# Patient Record
Sex: FEMALE | Race: WHITE | NOT HISPANIC OR LATINO | Employment: OTHER | URBAN - METROPOLITAN AREA
[De-identification: names, ages, dates, MRNs, and addresses within clinical notes are randomized per-mention and may not be internally consistent; named-entity substitution may affect disease eponyms.]

---

## 2019-01-18 ENCOUNTER — OFFICE VISIT CONVERTED (OUTPATIENT)
Dept: PULMONOLOGY | Facility: CLINIC | Age: 59
End: 2019-01-18
Attending: INTERNAL MEDICINE

## 2019-03-06 ENCOUNTER — HOSPITAL ENCOUNTER (OUTPATIENT)
Dept: GENERAL RADIOLOGY | Facility: HOSPITAL | Age: 59
Discharge: HOME | End: 2019-03-06
Attending: INTERNAL MEDICINE

## 2019-04-16 ENCOUNTER — HOSPITAL ENCOUNTER (OUTPATIENT)
Dept: GENERAL RADIOLOGY | Facility: HOSPITAL | Age: 59
Discharge: HOME | End: 2019-04-16
Attending: NURSE PRACTITIONER

## 2019-04-17 ENCOUNTER — OFFICE VISIT CONVERTED (OUTPATIENT)
Dept: PULMONOLOGY | Facility: CLINIC | Age: 59
End: 2019-04-17
Attending: INTERNAL MEDICINE

## 2019-05-02 ENCOUNTER — HOSPITAL ENCOUNTER (OUTPATIENT)
Dept: GENERAL RADIOLOGY | Facility: HOSPITAL | Age: 59
Discharge: HOME | End: 2019-05-02
Attending: NURSE PRACTITIONER

## 2019-05-17 ENCOUNTER — HOSPITAL ENCOUNTER (OUTPATIENT)
Dept: ULTRASOUND IMAGING | Facility: HOSPITAL | Age: 59
Discharge: HOME | End: 2019-05-17
Attending: NURSE PRACTITIONER

## 2019-06-05 ENCOUNTER — HOSPITAL ENCOUNTER (OUTPATIENT)
Dept: SLEEP MEDICINE | Facility: HOSPITAL | Age: 59
Discharge: HOME | End: 2019-06-05
Attending: INTERNAL MEDICINE

## 2019-06-10 ENCOUNTER — OFFICE VISIT CONVERTED (OUTPATIENT)
Dept: SURGERY | Facility: CLINIC | Age: 59
End: 2019-06-10
Attending: SURGERY

## 2019-07-09 ENCOUNTER — HOSPITAL ENCOUNTER (OUTPATIENT)
Dept: PERIOP | Facility: HOSPITAL | Age: 59
Setting detail: HOSPITAL OUTPATIENT SURGERY
Discharge: HOME | End: 2019-07-09
Attending: SURGERY

## 2019-07-09 LAB
ANION GAP SERPL CALC-SCNC: 12 MMOL/L (ref 8–19)
BASOPHILS # BLD AUTO: 0.04 10*3/UL (ref 0–0.2)
BASOPHILS NFR BLD AUTO: 0.4 % (ref 0–3)
BUN SERPL-MCNC: 15 MG/DL (ref 5–25)
BUN/CREAT SERPL: 17 {RATIO} (ref 6–20)
CALCIUM SERPL-MCNC: 8.9 MG/DL (ref 8.7–10.4)
CHLORIDE SERPL-SCNC: 104 MMOL/L (ref 99–111)
CONV ABS IMM GRAN: 0.02 10*3/UL (ref 0–0.2)
CONV CO2: 31 MMOL/L (ref 22–32)
CONV IMMATURE GRAN: 0.2 % (ref 0–1.8)
CREAT UR-MCNC: 0.87 MG/DL (ref 0.5–0.9)
DEPRECATED RDW RBC AUTO: 47 FL (ref 36.4–46.3)
EOSINOPHIL # BLD AUTO: 0.16 10*3/UL (ref 0–0.7)
EOSINOPHIL # BLD AUTO: 1.7 % (ref 0–7)
ERYTHROCYTE [DISTWIDTH] IN BLOOD BY AUTOMATED COUNT: 12.8 % (ref 11.7–14.4)
GFR SERPLBLD BASED ON 1.73 SQ M-ARVRAT: >60 ML/MIN/{1.73_M2}
GLUCOSE SERPL-MCNC: 106 MG/DL (ref 65–99)
HBA1C MFR BLD: 11.6 G/DL (ref 12–16)
HCT VFR BLD AUTO: 36.8 % (ref 37–47)
LYMPHOCYTES # BLD AUTO: 4.05 10*3/UL (ref 1–5)
MCH RBC QN AUTO: 31.5 PG (ref 27–31)
MCHC RBC AUTO-ENTMCNC: 31.5 G/DL (ref 33–37)
MCV RBC AUTO: 100 FL (ref 81–99)
MONOCYTES # BLD AUTO: 0.62 10*3/UL (ref 0.2–1.2)
MONOCYTES NFR BLD AUTO: 6.5 % (ref 3–10)
NEUTROPHILS # BLD AUTO: 4.6 10*3/UL (ref 2–8)
NEUTROPHILS NFR BLD AUTO: 48.5 % (ref 30–85)
NRBC CBCN: 0 % (ref 0–0.7)
OSMOLALITY SERPL CALC.SUM OF ELEC: 297 MOSM/KG (ref 273–304)
PLATELET # BLD AUTO: 243 10*3/UL (ref 130–400)
PMV BLD AUTO: 8.8 FL (ref 9.4–12.3)
POTASSIUM SERPL-SCNC: 3.8 MMOL/L (ref 3.5–5.3)
RBC # BLD AUTO: 3.68 10*6/UL (ref 4.2–5.4)
SODIUM SERPL-SCNC: 143 MMOL/L (ref 135–147)
VARIANT LYMPHS NFR BLD MANUAL: 42.7 % (ref 20–45)
WBC # BLD AUTO: 9.49 10*3/UL (ref 4.8–10.8)

## 2019-07-17 ENCOUNTER — OFFICE VISIT CONVERTED (OUTPATIENT)
Dept: SURGERY | Facility: CLINIC | Age: 59
End: 2019-07-17
Attending: SURGERY

## 2019-08-02 ENCOUNTER — OFFICE VISIT CONVERTED (OUTPATIENT)
Dept: PULMONOLOGY | Facility: CLINIC | Age: 59
End: 2019-08-02
Attending: INTERNAL MEDICINE

## 2019-09-20 ENCOUNTER — OFFICE VISIT CONVERTED (OUTPATIENT)
Dept: SURGERY | Facility: CLINIC | Age: 59
End: 2019-09-20
Attending: SURGERY

## 2019-10-17 ENCOUNTER — HOSPITAL ENCOUNTER (OUTPATIENT)
Dept: PERIOP | Facility: HOSPITAL | Age: 59
Setting detail: HOSPITAL OUTPATIENT SURGERY
Discharge: HOME | End: 2019-10-17
Attending: SURGERY

## 2019-10-22 ENCOUNTER — OFFICE VISIT CONVERTED (OUTPATIENT)
Dept: SURGERY | Facility: CLINIC | Age: 59
End: 2019-10-22
Attending: SURGERY

## 2019-10-22 ENCOUNTER — CONVERSION ENCOUNTER (OUTPATIENT)
Dept: SURGERY | Facility: CLINIC | Age: 59
End: 2019-10-22

## 2019-11-04 ENCOUNTER — OFFICE VISIT CONVERTED (OUTPATIENT)
Dept: SURGERY | Facility: CLINIC | Age: 59
End: 2019-11-04
Attending: SURGERY

## 2019-11-18 ENCOUNTER — OFFICE VISIT CONVERTED (OUTPATIENT)
Dept: SURGERY | Facility: CLINIC | Age: 59
End: 2019-11-18
Attending: SURGERY

## 2019-11-18 ENCOUNTER — CONVERSION ENCOUNTER (OUTPATIENT)
Dept: SURGERY | Facility: CLINIC | Age: 59
End: 2019-11-18

## 2020-05-05 ENCOUNTER — OFFICE VISIT CONVERTED (OUTPATIENT)
Dept: PULMONOLOGY | Facility: CLINIC | Age: 60
End: 2020-05-05
Attending: INTERNAL MEDICINE

## 2020-08-14 ENCOUNTER — HOSPITAL ENCOUNTER (OUTPATIENT)
Dept: GENERAL RADIOLOGY | Facility: HOSPITAL | Age: 60
Discharge: HOME | End: 2020-08-14
Attending: INTERNAL MEDICINE

## 2021-03-04 ENCOUNTER — HOSPITAL ENCOUNTER (OUTPATIENT)
Dept: GENERAL RADIOLOGY | Facility: HOSPITAL | Age: 61
Discharge: HOME | End: 2021-03-04
Attending: NURSE PRACTITIONER

## 2021-05-15 VITALS — HEIGHT: 65 IN | WEIGHT: 175.5 LBS | RESPIRATION RATE: 14 BRPM | BODY MASS INDEX: 29.24 KG/M2

## 2021-05-15 VITALS — BODY MASS INDEX: 29.32 KG/M2 | RESPIRATION RATE: 16 BRPM | HEIGHT: 65 IN | WEIGHT: 176 LBS

## 2021-05-15 VITALS — WEIGHT: 173.12 LBS | HEIGHT: 66 IN | BODY MASS INDEX: 27.82 KG/M2 | RESPIRATION RATE: 14 BRPM

## 2021-05-15 VITALS — WEIGHT: 169 LBS | BODY MASS INDEX: 27.16 KG/M2 | HEIGHT: 66 IN | RESPIRATION RATE: 16 BRPM

## 2021-05-15 VITALS — WEIGHT: 167 LBS | RESPIRATION RATE: 14 BRPM | HEIGHT: 66 IN | BODY MASS INDEX: 26.84 KG/M2

## 2021-05-15 VITALS — BODY MASS INDEX: 27.97 KG/M2 | WEIGHT: 174 LBS | RESPIRATION RATE: 14 BRPM | HEIGHT: 66 IN

## 2021-05-28 VITALS
BODY MASS INDEX: 28.04 KG/M2 | TEMPERATURE: 98.5 F | SYSTOLIC BLOOD PRESSURE: 111 MMHG | HEART RATE: 83 BPM | OXYGEN SATURATION: 96 % | TEMPERATURE: 97.8 F | HEART RATE: 72 BPM | WEIGHT: 168.56 LBS | DIASTOLIC BLOOD PRESSURE: 60 MMHG | DIASTOLIC BLOOD PRESSURE: 65 MMHG | SYSTOLIC BLOOD PRESSURE: 122 MMHG | RESPIRATION RATE: 14 BRPM | BODY MASS INDEX: 29.3 KG/M2 | SYSTOLIC BLOOD PRESSURE: 121 MMHG | OXYGEN SATURATION: 93 % | RESPIRATION RATE: 14 BRPM | HEIGHT: 65 IN | RESPIRATION RATE: 14 BRPM | BODY MASS INDEX: 28.08 KG/M2 | HEART RATE: 80 BPM | WEIGHT: 182.31 LBS | WEIGHT: 168.31 LBS | TEMPERATURE: 98 F | OXYGEN SATURATION: 95 % | DIASTOLIC BLOOD PRESSURE: 69 MMHG | HEIGHT: 66 IN | HEIGHT: 65 IN

## 2021-05-28 NOTE — PROGRESS NOTES
Patient: MURIEL CASTRO     Acct: [id number]     Report: #[id number]  UNIT #: [id number]     : 1960    Encounter Date:2019  PRIMARY CARE: CATHY WELLINGTON  ***Signed***  --------------------------------------------------------------------------------------------------------------------  Chief Complaint      Encounter Date      2019            Primary Care Provider      RADHA LOPEZ            Referring Provider      RADHA LOPEZ            Patient Complaint      Patient is complaining of      copd            VITALS      Height 5 ft 6 in / 167.64 cm      Weight 182 lbs 5 oz / 82.444819 kg      BSA 1.92 m2      BMI 29.4 kg/m2      Temperature 98.5 F / 36.94 C - Oral      Pulse 83      Respirations 14      Blood Pressure 122/65 Sitting, Left Arm      Pulse Oximetry 96%, roomair      Initial Exhaled Nitrous Oxide      Date:  2019      Exhaled Nitrous Oxide Results:  13            HPI      The patient is a very pleasant 58 year old  female former cigarette     smoker with COPD, chronic hypoxemic respiratory failure here to establish care.     She is new to [place], previously lived in [place] and she saw a     pulmonologist in [place] named Dr. Wiggins. She has had pulmonary function tests     done with him and has all of the records being faxed here currently, however I     do not have any records at this time.  She has a 40 pack year cigarette smoking     history and quit smoking in .  She is on triple inhaler therapy with Breo     and Incruse and is also on Daliresp.  She has obstructive sleep apnea and wears     BiPAP at night. Her machine is five years old and needs a new machine per her.     She does get short of breath with exertion.  Short of breath is worse with     walking about 700-800 feet, mild to moderate in severity, worse with exertion     and relieved with rest.  She denies any wheezing, but does have intermittent dry    cough most days out of the  week with no sputum production or hemoptysis.  She     has an albuterol nebulizer that she uses 1-2 times per day, but overall has been    doing well.  She denies any morning headaches, excessive daytime sleepiness and     BiPAP has helped her sleep apnea significantly.  She has never been enrolled in     lung cancer screening.  Denies any nausea, vomiting, fevers, chills, headaches,     chest pain or hemoptysis.  She is able to perform her ADLs without difficulty.      Denies any swollen glands or lymph nodes of the head and neck.            I have personally reviewed the review of systems, past family, social, surgical     and medical histories and I agree with the findings.            ROS      Constitutional:  Denies: Fatigue, Fever, Weight gain, Weight loss, Chills,     Insomnia, Other      Respiratory/Breathing:  Complains of: Shortness of air, Wheezing, Cough; Denies:    Hemoptysis, Pleuritic pain, Other      Endocrine:  Denies: Polydipsia, Polyuria, Heat/cold intolerance, Abnorml     menstrual pattern, Diabetes, Other      Ears, nose, mouth, throat:  Denies: Mouth lesions, Thrush, Throat pain,     Hoarseness, Allergies/Hay Fever, Post Nasal Drip, Headaches, Recent Head Injury,    Nose Bleeding, Neck Stiffness, Thyroid Mass, Hearing Loss, Ear Fullness, Dry     Mouth, Nasal or Sinus Pain, Dry Lips, Nasal discharge, Nasal congestion, Other      Cardiovascular:  Denies: Palpitations, Syncope, Claudication, Chest Pain, Wake     up Gasping for air, Leg Swelling, Irregular Heart Rate, Cyanosis, Dyspnea on     Exertion, Other      Gastrointestinal:  Denies: Nausea, Constipation, Diarrhea, Abdominal pain,     Vomiting, Difficulty Swallowing, Reflux/Heartburn, Dysphagia, Jaundice,     Bloating, Melena, Bloody stools, Other      Hematologic/lymphatic:  DENIES: Lymphadenopathy, Bruising, Bleeding tendencies,     Other      Neurological:  Denies: Headache, Numbness, Weakness, Seizures, Other      Psychiatric:  Denies:  Anxiety, Appropriate Effect, Depression, Other      Sleep:  No: Excessive daytime sleep, Morning Headache?, Snoring, Insomnia?, Stop    breathing at sleep?, Other      Integumentary:  Denies: Rash, Dry skin, Skin Warm to Touch, Other      Immunologic/Allergic:  Denies: Latex allergy, Seasonal allergies, Asthma,     Urticaria, Eczema, Other      Immunization status:  No: Up to date            FAMILY/SOCIAL/MEDICAL HX      Surgical History:  Yes: Appendectomy, Breast Surgery (biopsy), Hernia Surgery      Diabetes - Family Hx:  Brother      Is Father Still Living?:  No      Is Mother Still Living?:  No       Family History:  Yes      Social History:  No Tobacco Use, No Alcohol Use, No Recreational Drug use      Smoking status:  Former smoker (1 ppd x 40 y quit 2011)      Hysterectomy:  Yes      Anticoagulation Therapy:  No      Antibiotic Prophylaxis:  No      Medical History:  Yes: Asthma, Chronic Bronchitis/COPD            PREVENTION      Hx Influenza Vaccination:  Yes      Date Influenza Vaccine Given:  Dec 1, 2018      Influenza Vaccine Declined:  No      2 or More Falls Past Year?:  No      Fall Past Year with Injury?:  No      Hx Pneumococcal Vaccination:  Yes      Encouraged to follow-up with:  PCP regarding preventative exams.      Chart initiated by      timur/ ma            ALLERGIES/MEDICATIONS      Allergies:        Coded Allergies:             SULFAMETHOXAZOLE (Verified  Allergy, Unknown, 1/18/19)           TRIMETHOPRIM (Verified  Allergy, Unknown, 1/18/19)      Uncoded Allergies:             zpack (Allergy, Unknown, 12/19/18)      Medications    Last Reconciled on 1/18/19 11:30 by JJ GOLDEN MD      Oxygen (OXYGEN)  Gas               Reported         1/18/19       BIPAP Compressor (BIPAP) 1 Each Each      EACH XX ONCE, #1 0 Refills         Reported         1/18/19       Fexofenadine Hcl (Fexofenadine Hcl) 180 Mg Tablet      180 MG PO QDAY, #30 TAB 0 Refills         Reported         1/18/19        PSEUDOEPHEDRINE HCl (Sudogest) 60 Mg Tablet      30 MG PO Q6H PRN for CONGESTION, TAB 0 Refills         Reported         1/18/19       Cholecalciferol (Vitamin D3*) 2,000 Unit Tablet      5000 UNITS PO QDAY, #60 TAB 0 Refills         Reported         1/18/19       Montelukast Sodium (Singulair*) 10 Mg Tablet      10 MG PO QDAY, #30 TAB 0 Refills         Reported         1/18/19       Calcium Carb/Vit D3 (CALCIUM 600-VIT D3 400 TABLET) 1 Each Tablet      1 EACH PO QDAY, #60 TAB 0 Refills         Reported         1/18/19       Furosemide* (Lasix*) 20 Mg Tablet      20 MG PO QDAY, #30 TAB 0 Refills         Reported         1/18/19       Esomeprazole Mag (NexIUM*) 40 Mg Suspdr.pkt      40 MG PO QDAY@07, #30 PACKET 0 Refills         Reported         1/18/19       Shad-Fluticasone (Fluticasone 50 mcg) 16 Gm Spray.susp      2 PUFFS NARE EACH QDAY, #1 BOTTLE 0 Refills         Reported         1/18/19       Alendronate Sodium (Fosamax) 70 Mg Tablet      70 MG PO Fr for 30 Days, #4 TAB         Reported         1/18/19       Roflumilast (Daliresp) 500 Mcg Tab      500 MCG PO QDAY for 30 Days, #30 TAB         Reported         1/18/19       busPIRone HCl (busPIRone HCl) 15 Mg Tablet      7.5 MG PO BID, #30 TAB         Reported         1/18/19       Meloxicam (Meloxicam*) 15 Mg Tablet      15 MG PO QDAY, #30 TAB 0 Refills         Reported         1/18/19       MDI-Albuterol (Ventolin HFA) 18 Gm Hfa.aer.ad      2 PUFFS INH Q6H PRN for SHORTNESS OF BREATH, #1 MDI 0 Refills         Reported         1/18/19       Umeclidinium Bromide (Incruse Ellipta) 62.5 Mcg Blst.w.dev      1 PUFF INH RTQDAY, #1 MDI 0 Refills         Reported         1/18/19       Budesonide/Formoterol Fumarate (Symbicort 160/4.5 Mcg) 10.2 Gm Inh      2 PUFF INH RTBID, #1 INH 0 Refills         Reported         1/18/19       Benzonatate (Tessalon Perles) 100 Mg Cap      100 MG PO TID, #20 CAP         Prov: CARLOS BENITEZ CFR         12/19/18       Azelastine Hcl  (Azelastine Nasal) 137 Mcg/0.137 Ml Spray.pump      1 PUFFS NARE EACH BID, #1 BOTTLE         Prov: CARLOS BENITEZ CFR         12/19/18       Dm/Pse/Bpm 10-30-2 Mg/5ML (BROMFED DM COUGH SYRUP) 473 Ml Syrup      10 ML PO Q6H PRN for COUGH, #118 ML         Prov: CARLOS BENITEZ CFR         12/19/18       PSEUDOEPHEDRINE HCl (Sudogest) 60 Mg Tablet      30 MG PO Q6H PRN for CONGESTION, TAB 0 Refills         Reported         12/19/18       Fexofenadine Hcl (Fexofenadine Hcl) 180 Mg Tablet      180 MG PO QDAY, #30 TAB 0 Refills         Reported         12/19/18       Montelukast Sodium (Montelukast*) Unknown Strength Tablet      PO QDAY, TAB         Reported         12/19/18       Furosemide (Furosemide) 20 Mg Tablet      20 MG PO QDAY, #30 TAB 0 Refills         Reported         12/19/18       Esomeprazole Mag (NexIUM) 40 Mg Capsule      40 MG PO QDAY@07, #30 CAP 0 Refills         Reported         12/19/18       Shad-Fluticasone (Fluticasone 50 mcg) 16 Gm Spray.susp      2 PUFFS NARE EACH QDAY, #1 BOTTLE 0 Refills         Reported         12/19/18       Alendronate Sodium (Alendronate) 70 Mg Tablet      70 MG PO Fr, #4 TAB         Reported         12/19/18       Roflumilast (Daliresp) 500 Mcg Tab      500 MCG PO QDAY for 30 Days, #30 TAB         Reported         12/19/18       busPIRone HCl (busPIRone HCl) 15 Mg Tablet      7.5 MG PO BID, #30 TAB         Reported         12/19/18       Meloxicam (Meloxicam*) 15 Mg Tablet      15 MG PO QDAY, #30 TAB 0 Refills         Reported         12/19/18       MDI-Albuterol (Ventolin HFA) 18 Gm Hfa.aer.ad      2 PUFFS INH Q4H PRN for SHORTNESS OF BREATH, #1 INH 0 Refills         Reported         12/19/18       Fluticasone/Vilanterol 100-25 Mcg Inh (Breo Ellipta 100-25 Mcg Inh) Unknown     Strength Blst.w.dev      INH QDAY, #1 MDI 0 Refills         Reported         12/19/18       Budesonide/Formoterol Fumarate (Symbicort 160/4.5 Mcg) 10.2 Gm Inh      2 PUFF INH RTBID, #1 INH 0 Refills          Reported         12/19/18      Current Medications      Current Medications Reviewed 1/18/19            EXAM      Vital Signs Reviewed.      General:  WDWN, Alert, NAD.      HEENT: PERRL, EOMI.  OP, nares clear, no sinus tenderness.      Neck: Supple, no JVD, no thyromegaly.      Lymph: No axillary, cervical, supraclavicular lymphadenopathy noted bilaterally.      Chest: Good aeration, trace bibasilar rhonchi at the bases, tympanic to     percussion bilaterally, no work of breathing noted.      CV: RRR, no MGR, pulses 2+, equal.        Abd: Soft, NT, ND, +BS, no HSM.      EXT: No clubbing, no cyanosis, no edema, no joint tenderness.        Neuro:  A  Skin: No rashes or lesions.      Vtials      Vitals:             Height 5 ft 6 in / 167.64 cm           Weight 182 lbs 5 oz / 82.281844 kg           BSA 1.92 m2           BMI 29.4 kg/m2           Temperature 98.5 F / 36.94 C - Oral           Pulse 83           Respirations 14           Blood Pressure 122/65 Sitting, Left Arm           Pulse Oximetry 96%, roomair            REVIEW      Results Reviewed      PCCS Results Reviewed?:  Yes Prev Lab Results, Yes Prev Radiology Results, Yes     Previous [facility] Records      Lab Results      I reviewed office notes from her referring provider. I also reviewed labs     showing no peripheral eosinophilia and no evidence of chronic hypercapnic     respiratory failure.            Assessment      NICOTINE DEPENDENCE, CIGARETTES, IN REMISSION - F17.211            COPD (chronic obstructive pulmonary disease)         Centrilobular emphysema - J43.2         COPD type: emphysema         Emphysema type: centrilobular            Notes      New Medications      * Budesonide/Formoterol Fumarate (Symbicort 160/4.5 Mcg) 10.2 GM INH: 2 PUFF INH      RTBID #1      * UMECLIDINIUM BROMIDE (Incruse Ellipta) 62.5 MCG BLST.W.DEV: 1 PUFF INH RTQDAY       #1      * MDI-Albuterol (Ventolin HFA) 18 GM HFA.AER.AD: 2 PUFFS INH Q6H PRN SHORTNESS       OF  BREATH #1      * Meloxicam (Meloxicam*) 15 MG TABLET: 15 MG PO QDAY #30      * busPIRone HCl 15 MG TABLET: 7.5 MG PO BID #30      * ROFLUMILAST (Daliresp) 500 MCG TAB: 500 MCG PO QDAY 30 Days #30      * Alendronate Sodium (Fosamax) 70 MG TABLET: 70 MG PO Fr 30 Days #4      * Shad-Fluticasone (Fluticasone 50 mcg) 16 GM SPRAY.SUSP: 2 PUFFS NARE EACH QDAY       #1      * Esomeprazole Mag (NexIUM*) 40 MG SUSPDR.PKT: 40 MG PO QDAY@07 #30         Instructions: Take on an empty stomach, one hour before or two hours after        meal.      * Furosemide* (Lasix*) 20 MG TABLET: 20 MG PO QDAY #30      * Calcium Carb/Vit D3 (CALCIUM 600-VIT D3 400 TABLET) 1 EACH TABLET: 1 EACH PO       QDAY #60      * MONTELUKAST SODIUM (Singulair*) 10 MG TABLET: 10 MG PO QDAY #30      * CHOLECALCIFEROL (Vitamin D3*) 2,000 UNIT TABLET: 5,000 UNITS PO QDAY #60      * PSEUDOEPHEDRINE HCl (Sudogest) 60 MG TABLET: 30 MG PO Q6H PRN CONGESTION      * Fexofenadine Hcl 180 MG TABLET: 180 MG PO QDAY #30      * BIPAP Compressor (BIPAP) 1 EACH EACH: EACH XX ONCE #1      * OXYGEN GAS:       New Diagnostics      *  Discuss Need Ldct, Routine         Dx: NICOTINE DEPENDENCE, CIGARETTES, IN REMISSION - F17.211      * LDCT for lung ca screening, Routine         Dx: NICOTINE DEPENDENCE, CIGARETTES, IN REMISSION - F17.211      IMPRESSION:      1.  Dyspnea on exertion, chronic and at baseline.      2. Chronic intermittent cough at baseline.      3. COPD of unclear severity. The patient has PFTs done by another pulmonologist     which we need to get the records for. She is on triple inhaler therapy and     Daliresp.  COPD assessment test score is 4 today signifying adequate control of     underlying disease on current therapies.      4. Nicotine dependence with cigarettes in remission, 40 pack year cigarette     smoking history and quit smoking in 2011.  She is eligible for lung cancer     screening.      5. Obstructive sleep apnea, well-controlled with  BiPAP.         6. Seasonal allergies, well-controlled.      7. Chronic hypoxemic respiratory failure.              PLAN:      1.  I performed exhale nitric oxide testing in the office today.  Level of 13     indicative of no eosinophilic airway inflammation.       2.  COPD appears to be well compensated and we will continue Breo and Incruse at    current doses with albuterol as needed.      3. Continue Daliresp.      4. We will get records from Feliciano's office in [place], [place].  We will see if she     needs any further pulmonary function testing or alpha 1 testing based off his     results.      5. We will contact her DME to see we can get her a new BiPAP machine as well as     sleep study reports and compliance reports from Dr. Wiggins.      6. Continue Singulair.      7. Continue 2 liters of daily and keep SPO2 greater than 90%.      8.  Shared decision making regarding lung cancer screening was performed in the     office today.  Please see risks versus benefits part of this note for further     details.  She is amendable to screening and I have placed an ordered for LDCT     for lung cancer screening.      9. Up-to-date with flu, Prevnar and Pneumovax.      10.  Follow up with me in six months.            Patient Education            Patient Education:        Chronic Obstructive Pulmonary Disease      Low-Dose CT Scan May Be Effective Screening Tool for Lung Cancer in People            LDCT      Assessment      Nicotine dependence, cigarettes, in remission V15.82/F17.211            Plan      LDCT Orders:   to discuss lung ca screen, LDCT for lung ca screeing            Determine need to perform LDCT      Determined ne to perform LDC:  Pt between ages of 55-77 years old., Absence of     sign/symptoms of lung ca      Smoking history:  25-50 pack years            CT History      Pt has not had a reg CT  last 12 mo            Time Spent/Education      Greater than 50% For Edcuation:  Yes      LDCT Patient  Education            * Patient was presented with the benefits and harms of screening to include:         The possible need for follow-up diagnostic testing         Over Diagnosis         False Positive Rate         Total Radiation Exposure            * Counseled on the importance of:         Adherence to annual lung cancer LDCT screening         Impact of co-morbidities         The ability or willingness to undergo diagnosis of treatment               * Counseled on the importance of cigarette cessation.      * Counseled on the importance of maintaining cigarette smoking abstinence.      * Handout provided regarding tobacco cessation interventions.      * Order for lung cancer screening with LDCT was given to the patient.                 Disclaimer: Converted document may not contain table formatting or lab diagrams. Please see [email or web address] for the authenticated document.

## 2021-05-28 NOTE — PROGRESS NOTES
Patient: MURIEL CASTRO     Acct: [id number]     Report: #[id number]  UNIT #: [id number]     : 1960    Encounter Date:2019  PRIMARY CARE: CATHY WELLINGTON  ***Signed***  --------------------------------------------------------------------------------------------------------------------  Chief Complaint      Encounter Date      Aug 2, 2019            Primary Care Provider      RADHA LOPEZ            Referring Provider      RADHA LOPEZ            Patient Complaint      Patient is complaining of      Pt here for 6 month follow up/LDCT results/ COPD            VITALS      Height 5 ft 5 in / 165.1 cm      Weight 168 lbs 9 oz / 76.384308 kg      BSA 1.84 m2      BMI 28.0 kg/m2      Temperature 98.0 F / 36.67 C - Oral      Pulse 80      Respirations 14      Blood Pressure 121/69 Sitting, Left Arm      Pulse Oximetry 93%, ROOMAIR      Initial Exhaled Nitrous Oxide      Date:  2019      Exhaled Nitrous Oxide Results:  13            HPI      The patient is a very pleasant 59 year old  female with chronic     obstructive pulmonary disease here for follow up.             Since her last office visit she had a chronic obstructive pulmonary disease     exacerbation treated by her primary care provider a week ago and feels much     better and back to her baseline. She wears 2 liter of oxygen intermittently. She    wears BiPAP 16/8 nightly and is doing well. She denies any excessive daytime     sleepiness or morning headaches. She denies any nausea or vomiting, fever or     chills, headaches or chest pain. She gets short of breath walking about 1500     feet and can go up 2 flights of stairs before stopping. It is mild to moderate     worse with exertion, relieved with rest. She is able to perform her ADL's     without difficulty and denies any swollen glands in her lymph nodes, head or     neck.            I personally reviewed Review of Systems, family, social, surgical and medical     history and  agree with their findings.            ROS      Constitutional:  Denies: Fatigue, Fever, Weight gain, Weight loss, Chills,     Insomnia, Other      Respiratory/Breathing:  Denies: Shortness of air, Wheezing, Cough, Hemoptysis,     Pleuritic pain, Other      Endocrine:  Denies: Polydipsia, Polyuria, Heat/cold intolerance, Abnorml     menstrual pattern, Diabetes, Other      Eyes:  Denies: Blurred vision, Vision Changes, Other      Ears, nose, mouth, throat:  Denies: Congestion, Dysphagia, Hearing Changes, Nose    Bleeding, Nasal Discharge, Throat pain, Tinnitus, Other      Cardiovascular:  Denies: Chest Pain, Exertional dyspnea, Peripheral Edema, P    alpitations, Syncope, Wake up Gasping for air, Orthopnea, Tachycardia, Other      Gastrointestinal:  Denies: Abdominal pain/cramping, Bloody stools, Constipation,    Diarrhea, Melena, Nausea, Vomiting, Other      Genitourinary:  Denies: Dysuria, Urinary frequency, Incontinence, Hematuria,     Urgency, Other      Musculoskeletal:  Denies: Joint Pain, Joint Stiffness, Joint Swelling, Myalgias,    Other      Hematologic/lymphatic:  DENIES: Lymphadenopathy, Bruising, Bleeding tendencies,     Other      Neurologic:  Denies: Headache, Numbness, Weakness, Seizures, Other      Psychiatric:  Denies: Anxiety, Appropriate Effect, Depression, Other      Sleep:  No: Excessive daytime sleep, Morning Headache?, Snoring, Insomnia?, Stop    breathing at sleep?, Other      Integumentary:  Denies: Rash, Dry skin, Skin Warm to Touch, Other            FAMILY/SOCIAL/MEDICAL HX      Surgical History:  Yes: Abdominal Surgery (RIGHT IHR), Appendectomy, Bladder     Surgery (COLONOSCOPY), Breast Surgery (biopsy), Hernia Surgery      Diabetes - Family Hx:  Brother      Is Father Still Living?:  No      Is Mother Still Living?:  No       Family History:  Yes      Social History:  Tobacco Use; No Alcohol Use, No Recreational Drug use      Smoking status:  Former smoker (1 ppd x 40 y quit 2011)       Smoking history:  25-50 pack years      Hysterectomy:  Yes      Anticoagulation Therapy:  No      Antibiotic Prophylaxis:  No      Medical History:  Yes: Arthritis, Asthma, Chronic Bronchitis/COPD (INHALER),     High Blood Pressure (MEDS CONTROL), Shortness Of Breath, Miscellaneous     Medical/oth (ATYPICAL LOBULAR HYPERPLASIA RIGHT BREAST); No: Blood Disease,     Chemotherapy/Cancer, Deafness or Ringing Ears, Diabetes, Hemorrhoids/Rectal     Prob, Sinus Trouble      Psychiatric History      None            PREVENTION      Hx Influenza Vaccination:  Yes      Date Influenza Vaccine Given:  Dec 1, 2018      Influenza Vaccine Declined:  No      2 or More Falls Past Year?:  No      Fall Past Year with Injury?:  No      Hx Pneumococcal Vaccination:  Yes      Encouraged to follow-up with:  PCP regarding preventative exams.      Chart initiated by      Matthew Maciel MA            ALLERGIES/MEDICATIONS      Allergies:        Coded Allergies:             AZITHROMYCIN (Verified  Allergy, Severe, BREATHING DIFFICULTIES, 8/2/19)           SULFAMETHOXAZOLE (Verified  Allergy, Unknown, 8/2/19)           TRIMETHOPRIM (Verified  Allergy, Unknown, 8/2/19)      Medications    Last Reconciled on 8/2/19 16:04 by JJ GOLDEN MD      Methylprednisolone (Medrol Dosepak) 4 Mg Tab.ds.pk      4 MG PO ASDIR, #1 PACKET         Reported         8/2/19       Cefaclor (Cefaclor) 250 Mg Cap      250 MG PO TID, #30 CAP         Reported         8/2/19       Hydrocodone/Acetaminophen 5/325 MG (Hydrocodone/Acetaminophen 5/325 MG) 1 Each     Tablet      2 TAB PO Q4H PRN for PAIN, #30 TAB         Prov: ABAD CORTEZ MD         7/9/19       Fluticasone/Umeclidin/Vilanter (Trelegy Ellipta 100-62.5-25) 1 Each Blst.w.dev      1 PUFF INH RTQDAY, #1 INH         Reported         7/9/19       Guaifenesin (Guaifenesin) 400 Mg Tab      1 TAB PO BID         Reported         7/2/19       Aspirin EC (Aspirin EC) 81 Mg Tablet.      81 MG PO HS, #30 TAB  0 Refills         Reported         7/2/19       Cholecalciferol (Vitamin D3) 5,000 Unit Capsule      5000 UNITS PO QDAY for 30 Days, #30 CAP         Reported         7/2/19       Montelukast Sodium (Montelukast*) 10 Mg Tablet      10 MG PO HS, TAB         Reported         7/2/19       Roflumilast (Daliresp) 500 Mcg Tab      500 MCG PO QDAY@12 for 30 Days, #30 TAB         Reported         7/2/19       Omeprazole (Omeprazole*) 40 Mg Capsule      40 MG PO QDAY, #30 CAP 0 Refills         Reported         7/2/19       Calcium Carb/Vit D3 (CALCIUM 600-VIT D3 400 TABLET) 1 Each Tablet      1 EACH PO Q2D, #60 TAB 0 Refills         Reported         1/18/19       MDI-Albuterol (Ventolin HFA) 18 Gm Hfa.aer.ad      2 PUFFS INH Q6H PRN for SHORTNESS OF BREATH, #1 MDI 0 Refills         Reported         1/18/19       Fexofenadine Hcl (Fexofenadine Hcl) 180 Mg Tablet      180 MG PO HS, #30 TAB 0 Refills         Reported         12/19/18       Furosemide (Furosemide) 20 Mg Tablet      20 MG PO QDAY, #30 TAB 0 Refills         Reported         12/19/18       Shad-Fluticasone (Fluticasone 50 mcg) 16 Gm Spray.susp      2 PUFFS NARE EACH QDAY, #1 BOTTLE 0 Refills         Reported         12/19/18      Current Medications      Current Medications Reviewed 8/2/19            EXAM      Vital Signs Reviewed      Gen: WDWN, Alert, NAD.        HEENT:  PERRL, EOMI.  OP, nares clear, no sinus tenderness.      Chest:  Good aeration, clear to auscultation bilaterally, tympanic to percussion    bilaterally, no work of breathing noted.      CV:  RRR, no MGR, pulses 2+, equal.      Abd:  Soft, NT, ND, + BS, no HSM.      EXT:  No clubbing, no cyanosis, no edema.       Neuro:  A  Skin: No rashes or lesions.      Vitals      Vitals:             Height 5 ft 5 in / 165.1 cm           Weight 168 lbs 9 oz / 76.476280 kg           BSA 1.84 m2           BMI 28.0 kg/m2           Temperature 98.0 F / 36.67 C - Oral           Pulse 80           Respirations 14            Blood Pressure 121/69 Sitting, Left Arm           Pulse Oximetry 93%, ROOMAIR            REVIEW      Results Reviewed      PCCS Results Reviewed?:  Yes Previous [facility] Records            Assessment      Notes      New Medications      * Cefaclor 250 MG CAP: 250 MG PO TID #30      * Methylprednisolone (Medrol Dosepak) 4 MG TAB.DS.PK: 4 MG PO ASDIR #1         Instructions: Take dosepack as directed on package. Take all of first day's        tablets the first day.      IMPRESSION:      1. Severe Chronic Obstructive Pulmonary Disease well controlled on current     inhaler regimen. FEV1 less than 50% by her pulmonologist in [place] Dr. Holly.     She is on triple inhaler therapy. COPD assessment test score is 10 today     signifying great control of underlying  disease. Is on daliresp and had a recent    exacerbation resolving with outpatient treatment      2. NE well controlled with BiPAP.       3. Chronic hypoxemic respiratory failure on 2 liters per minute of oxygen.       4. Tobacco abuse of cigarettes in remission.             PLAN:      1. Continue trelegy.       2. Continue annual low dose lung cancer screening for lung cancer.       3. Continue Singulair and Daliresp       4. Continue BiPAP nightly and with naps with settings off 16/8.       5. Continue 2 liters of oxygen to keep SPO2 above 90%.       6. Up to date with  flu, Prevnar and Pneumovax.         7. Follow up with me annually.            Patient Education      Patient Education Provided:  COPD                 Disclaimer: Converted document may not contain table formatting or lab diagrams. Please see [employer] [facility] for the authenticated document.

## 2021-05-28 NOTE — PROGRESS NOTES
Patient: ISIS MARS     Acct: [id number]     Report: #[id number]  UNIT #: [id number]     : 1960    Encounter Date:2020  PRIMARY CARE: CATHY WELLINGTON  ***Signed***  --------------------------------------------------------------------------------------------------------------------  TELEHEALTH NOTE      History of Present Illness      Chief Complaint: f/u copd            Isis Mars is presenting for evaluation via Telehealth visit. Verbal     consent obtained before beginning visit.            Provider spent 11 minutes with the patient during telehealth visit including     discussing the case with the patient over the phone and personally reviewing all    pertinent labs, imaging and provider notes.            The following staff were present during the visit: trisha faustin/ lian herrera/ md            The patient is a very pleasant 59 year old  female with chronic     obstructive pulmonary disease  who presents for Telehealth visit today. Since     her last office visit she has not had any chronic obstructive pulmonary disease     exacerbations. She is at her baseline. She wears 2 liters of oxygen occasionally    with exertion. She wears BiPAP 16/8 nightly. She is on [employer] machine. Her     symptoms are at baseline. She gets short of breath walking about 0678-1954 feet     or going up 3 flights of steps. It is mild to moderate worse with exertion,     relieved with rest. She denies any coughing, wheezing, headaches and hemoptysis     or chest pain. She is able to perform her ADL's without difficulty and denies     any swollen glands in her lymph nodes, head or neck. She denies any morning     headaches or excessive daytime sleepiness.             I personally reviewed Review of Systems, family, social, surgical and medical     history and agree with their findings.            Physical exam is deferred due to Telehealth visit.                           Overview of  "Symptoms      pt states \" I am not soa, coughing wheezing\"            Allergies/Medications      Allergies:        Coded Allergies:             AZITHROMYCIN (Verified  Allergy, Severe, BREATHING DIFFICULTIES, 5/5/20)           SULFAMETHOXAZOLE (Verified  Allergy, Unknown, 5/5/20)           TRIMETHOPRIM (Verified  Allergy, Unknown, 5/5/20)      Medications    Last Reconciled on 5/5/20 13:17 by JJ GOLDEN MD      Fluticasone/Umeclidin/Vilanter (Trelegy Ellipta 100-62.5-25) 1 Each Blst.w.dev      1 PUFF INH QDAY, #1 INH 7 Refills         Prov: JJ GOLDEN         5/5/20       Roflumilast (Daliresp) 500 Mcg Tab      500 MCG PO QDAY for 30 Days, #30 TAB 7 Refills         Prov: JJ GOLDEN         5/5/20       HYDROcodone-Acetaminophen 5-325 Mg (HYDROcodone-Acetaminophen 5-325 Mg) 1 Each     Tablet      1-2 TAB PO Q4H for Post Surgical PAin, #8 TAB 0 Refills         Prov: Enmanuel Camarena         10/17/19       guaiFENesin (guaiFENesin) 400 Mg Tab      1 TAB PO BID         Reported         7/2/19       Aspirin EC (Aspirin EC) 81 Mg Tablet.dr      81 MG PO HS, #30 TAB 0 Refills         Reported         7/2/19       Cholecalciferol (Vitamin D3) (Vitamin D3) 5,000 Unit Capsule      5000 UNITS PO QDAY for 30 Days, #30 CAP         Reported         7/2/19       Montelukast Sodium (Montelukast*) 10 Mg Tablet      10 MG PO HS, TAB         Reported         7/2/19       Omeprazole (Omeprazole*) 40 Mg Capsule      40 MG PO QDAY, #30 CAP 0 Refills         Reported         7/2/19       Calcium Carb/Vit D3 (CALCIUM 600-VIT D3 400 TABLET) 1 Each Tablet      1 EACH PO Q2D, #60 TAB 0 Refills         Reported         1/18/19       MDI-Albuterol (Ventolin HFA) 18 Gm Hfa.aer.ad      2 PUFFS INH Q6H PRN for SHORTNESS OF BREATH, #1 MDI 0 Refills         Reported         1/18/19       Fexofenadine Hcl (Fexofenadine Hcl) 180 Mg Tablet      180 MG PO HS, #30 TAB 0 Refills         Reported         12/19/18       Furosemide (Furosemide) 20 Mg " Tablet      20 MG PO QDAY, #30 TAB 0 Refills         Reported         12/19/18       Shad-Fluticasone (Fluticasone 50 mcg) 16 Gm Spray.susp      2 PUFFS NARE EACH QDAY, #1 BOTTLE 0 Refills         Reported         12/19/18            Plan/Instructions      Ambulatory Assessment/Plan:        Notes      Renewed Medications      * ROFLUMILAST (Daliresp) 500 MCG TAB: 500 MCG PO QDAY 30 Days #30      * Fluticasone/Umeclidin/Vilanter (Trelegy Ellipta 100-62.5-25) 1 EACH       BLST.W.DEV: 1 PUFF INH QDAY #1      Plan/Instructions      * Plan Of Care: ()            * Chronic conditions reviewed and taken into consideration for today's treatment       plan.      * Patient instructed to seek medical attention urgently for new or worsening       symptoms.      * Patient was educated/instructed on their diagnosis, treatment and medications       prior to discharge from the clinic today.            IMPRESSION:      1. Severe chronic obstructive pulmonary disease well controlled on triple     inhaler therapy, FEV1 less than 50%. Chronic obstructive pulmonary disease     assessment test score is 6 signifying good control of underlying disease on     current therapies. She is on Daliresp with no recent exacerbations in 9 months.       2. Obstructive sleep apnea well controlled on BiPAP.       3. Chronic hypoxemic respiratory failure on 2 liters per minute of oxygen.       4. Tobacco abuse of cigarettes in remission.             PLAN:      1.  Continue trilogy machine.       2. Continue Daliresp.       3. Continue Singulair.       4. Continue annual low dose lung cancer screening CT scans.       5. Continue BiPAP nightly and with naps with settings of 16/8.       6. Continue supplemental oxygen to keep oxygen saturation at or above 89%.       7. Up to date with  flu, Prevnar and Pneumovax.         8. Follow up with us in 1 year, sooner if needed.      Codes:  Phone Eval 11-20 mi [id number]            Electronically signed by JJ GOLDEN   05/28/2020 13:28       Disclaimer: Converted document may not contain table formatting or lab diagrams. Please see [employer] [facility] for the authenticated document.

## 2021-06-11 ENCOUNTER — OFFICE VISIT (OUTPATIENT)
Dept: PULMONOLOGY | Facility: CLINIC | Age: 61
End: 2021-06-11

## 2021-06-11 VITALS
TEMPERATURE: 98.1 F | BODY MASS INDEX: 43.51 KG/M2 | HEIGHT: 55 IN | WEIGHT: 188 LBS | OXYGEN SATURATION: 91 % | RESPIRATION RATE: 16 BRPM | DIASTOLIC BLOOD PRESSURE: 65 MMHG | SYSTOLIC BLOOD PRESSURE: 113 MMHG | HEART RATE: 78 BPM

## 2021-06-11 DIAGNOSIS — G47.33 OBSTRUCTIVE SLEEP APNEA: ICD-10-CM

## 2021-06-11 DIAGNOSIS — J96.11 HYPOXEMIC RESPIRATORY FAILURE, CHRONIC (HCC): ICD-10-CM

## 2021-06-11 DIAGNOSIS — J44.9 CHRONIC OBSTRUCTIVE PULMONARY DISEASE, UNSPECIFIED COPD TYPE (HCC): ICD-10-CM

## 2021-06-11 DIAGNOSIS — H91.91 HEARING LOSS OF RIGHT EAR, UNSPECIFIED HEARING LOSS TYPE: Primary | ICD-10-CM

## 2021-06-11 PROCEDURE — 99214 OFFICE O/P EST MOD 30 MIN: CPT | Performed by: NURSE PRACTITIONER

## 2021-06-11 RX ORDER — MELOXICAM 15 MG/1
TABLET ORAL
COMMUNITY

## 2021-06-11 RX ORDER — MONTELUKAST SODIUM 10 MG/1
TABLET ORAL
COMMUNITY
End: 2021-06-11 | Stop reason: SDUPTHER

## 2021-06-11 RX ORDER — FUROSEMIDE 20 MG/1
20 TABLET ORAL DAILY
COMMUNITY
Start: 2021-05-21

## 2021-06-11 RX ORDER — AMOXICILLIN 875 MG/1
TABLET, COATED ORAL
COMMUNITY
Start: 2021-06-10 | End: 2022-04-12

## 2021-06-11 RX ORDER — MONTELUKAST SODIUM 10 MG/1
10 TABLET ORAL NIGHTLY
Qty: 90 TABLET | Refills: 4 | Status: SHIPPED | OUTPATIENT
Start: 2021-06-11 | End: 2022-04-12 | Stop reason: SDUPTHER

## 2021-06-11 RX ORDER — ESOMEPRAZOLE MAGNESIUM 40 MG/1
CAPSULE, DELAYED RELEASE ORAL
COMMUNITY
End: 2021-06-11

## 2021-06-11 RX ORDER — FLUTICASONE PROPIONATE 50 MCG
2 SPRAY, SUSPENSION (ML) NASAL DAILY
COMMUNITY
Start: 2021-06-01 | End: 2022-04-12 | Stop reason: SDUPTHER

## 2021-06-11 RX ORDER — ROFLUMILAST 500 UG/1
500 TABLET ORAL DAILY
Qty: 90 TABLET | Refills: 4 | Status: SHIPPED | OUTPATIENT
Start: 2021-06-11 | End: 2022-04-12 | Stop reason: SDUPTHER

## 2021-06-11 RX ORDER — CYCLOBENZAPRINE HCL 10 MG
TABLET ORAL
COMMUNITY
Start: 2021-04-30

## 2021-06-11 RX ORDER — ALENDRONATE SODIUM 70 MG/1
TABLET ORAL
COMMUNITY
Start: 2021-05-21

## 2021-06-11 RX ORDER — EPINEPHRINE 0.3 MG/.3ML
INJECTION SUBCUTANEOUS SEE ADMIN INSTRUCTIONS
COMMUNITY
Start: 2021-05-25

## 2021-06-11 RX ORDER — OFLOXACIN 3 MG/ML
SOLUTION AURICULAR (OTIC)
COMMUNITY
Start: 2021-04-14 | End: 2021-06-11

## 2021-06-11 RX ORDER — AZELASTINE 1 MG/ML
SPRAY, METERED NASAL
COMMUNITY
Start: 2021-04-30

## 2021-06-11 RX ORDER — OMEPRAZOLE 40 MG/1
40 CAPSULE, DELAYED RELEASE ORAL DAILY
COMMUNITY
Start: 2021-05-21

## 2021-06-11 RX ORDER — CEPHALEXIN 500 MG/1
CAPSULE ORAL
COMMUNITY
Start: 2021-03-30 | End: 2021-06-11

## 2021-06-11 RX ORDER — ROFLUMILAST 500 UG/1
500 TABLET ORAL DAILY
COMMUNITY
Start: 2021-05-21 | End: 2021-06-11 | Stop reason: SDUPTHER

## 2021-06-11 RX ORDER — FEXOFENADINE HCL 180 MG/1
TABLET ORAL
COMMUNITY
End: 2021-06-11

## 2021-06-11 RX ORDER — ALBUTEROL SULFATE 90 UG/1
2 AEROSOL, METERED RESPIRATORY (INHALATION) EVERY 6 HOURS PRN
Qty: 18 G | Refills: 4 | Status: SHIPPED | OUTPATIENT
Start: 2021-06-11 | End: 2022-04-12 | Stop reason: SDUPTHER

## 2021-06-11 RX ORDER — ALBUTEROL SULFATE 90 UG/1
AEROSOL, METERED RESPIRATORY (INHALATION)
COMMUNITY
End: 2021-06-11 | Stop reason: SDUPTHER

## 2021-06-11 NOTE — PATIENT INSTRUCTIONS
Sleep Apnea  Sleep apnea affects breathing during sleep. It causes breathing to stop for a short time or to become shallow. It can also increase the risk of:  · Heart attack.  · Stroke.  · Being very overweight (obese).  · Diabetes.  · Heart failure.  · Irregular heartbeat.  The goal of treatment is to help you breathe normally again.  What are the causes?  There are three kinds of sleep apnea:  · Obstructive sleep apnea. This is caused by a blocked or collapsed airway.  · Central sleep apnea. This happens when the brain does not send the right signals to the muscles that control breathing.  · Mixed sleep apnea. This is a combination of obstructive and central sleep apnea.  The most common cause of this condition is a collapsed or blocked airway. This can happen if:  · Your throat muscles are too relaxed.  · Your tongue and tonsils are too large.  · You are overweight.  · Your airway is too small.  What increases the risk?  · Being overweight.  · Smoking.  · Having a small airway.  · Being older.  · Being male.  · Drinking alcohol.  · Taking medicines to calm yourself (sedatives or tranquilizers).  · Having family members with the condition.  What are the signs or symptoms?  · Trouble staying asleep.  · Being sleepy or tired during the day.  · Getting angry a lot.  · Loud snoring.  · Headaches in the morning.  · Not being able to focus your mind (concentrate).  · Forgetting things.  · Less interest in sex.  · Mood swings.  · Personality changes.  · Feelings of sadness (depression).  · Waking up a lot during the night to pee (urinate).  · Dry mouth.  · Sore throat.  How is this diagnosed?  · Your medical history.  · A physical exam.  · A test that is done when you are sleeping (sleep study). The test is most often done in a sleep lab but may also be done at home.  How is this treated?    · Sleeping on your side.  · Using a medicine to get rid of mucus in your nose (decongestant).  · Avoiding the use of alcohol,  medicines to help you relax, or certain pain medicines (narcotics).  · Losing weight, if needed.  · Changing your diet.  · Not smoking.  · Using a machine to open your airway while you sleep, such as:  ? An oral appliance. This is a mouthpiece that shifts your lower jaw forward.  ? A CPAP device. This device blows air through a mask when you breathe out (exhale).  ? An EPAP device. This has valves that you put in each nostril.  ? A BPAP device. This device blows air through a mask when you breathe in (inhale) and breathe out.  · Having surgery if other treatments do not work.  It is important to get treatment for sleep apnea. Without treatment, it can lead to:  · High blood pressure.  · Coronary artery disease.  · In men, not being able to have an erection (impotence).  · Reduced thinking ability.  Follow these instructions at home:  Lifestyle  · Make changes that your doctor recommends.  · Eat a healthy diet.  · Lose weight if needed.  · Avoid alcohol, medicines to help you relax, and some pain medicines.  · Do not use any products that contain nicotine or tobacco, such as cigarettes, e-cigarettes, and chewing tobacco. If you need help quitting, ask your doctor.  General instructions  · Take over-the-counter and prescription medicines only as told by your doctor.  · If you were given a machine to use while you sleep, use it only as told by your doctor.  · If you are having surgery, make sure to tell your doctor you have sleep apnea. You may need to bring your device with you.  · Keep all follow-up visits as told by your doctor. This is important.  Contact a doctor if:  · The machine that you were given to use during sleep bothers you or does not seem to be working.  · You do not get better.  · You get worse.  Get help right away if:  · Your chest hurts.  · You have trouble breathing in enough air.  · You have an uncomfortable feeling in your back, arms, or stomach.  · You have trouble talking.  · One side of your  body feels weak.  · A part of your face is hanging down.  These symptoms may be an emergency. Do not wait to see if the symptoms will go away. Get medical help right away. Call your local emergency services (911 in the [place] Do not drive yourself to the hospital.  Summary  · This condition affects breathing during sleep.  · The most common cause is a collapsed or blocked airway.  · The goal of treatment is to help you breathe normally while you sleep.  This information is not intended to replace advice given to you by your health care provider. Make sure you discuss any questions you have with your health care provider.  Document Revised: 10/04/2019 Document Reviewed: 08/13/2019  [employer] Patient Education © 2021 [employer]      Chronic Obstructive Pulmonary Disease Exacerbation    Chronic obstructive pulmonary disease (COPD) is a long-term (chronic) condition that affects the lungs. COPD is a general term that can be used to describe many different lung problems that cause lung swelling (inflammation) and limit airflow, including chronic bronchitis and emphysema. COPD exacerbations are episodes when breathing symptoms become much worse and require extra treatment.  COPD exacerbations are usually caused by infections. Without treatment, COPD exacerbations can be severe and even life threatening. Frequent COPD exacerbations can cause further damage to the lungs.  What are the causes?  This condition may be caused by:  · Respiratory infections, including viral and bacterial infections.  · Exposure to smoke.  · Exposure to air pollution, chemical fumes, or dust.  · Things that give you an allergic reaction (allergens).  · Not taking your usual COPD medicines as directed.  · Underlying medical problems, such as congestive heart failure or infections not involving the lungs.  In many cases, the cause (trigger) of this condition is not known.  What increases the risk?  The following factors may make you more likely to  develop this condition:  · Smoking cigarettes.  · Old age.  · Frequent prior COPD exacerbations.  What are the signs or symptoms?  Symptoms of this condition include:  · Increased coughing.  · Increased production of mucus from your lungs (sputum).  · Increased wheezing.  · Increased shortness of breath.  · Rapid or labored breathing.  · Chest tightness.  · Less energy than usual.  · Sleep disruption from symptoms.  · Confusion or increased sleepiness.  Often these symptoms happen or get worse even with the use of medicines.  How is this diagnosed?  This condition is diagnosed based on:  · Your medical history.  · A physical exam.  You may also have tests, including:  · A chest X-ray.  · Blood tests.  · Lung (pulmonary) function tests.  How is this treated?  Treatment for this condition depends on the severity and cause of the symptoms. You may need to be admitted to a hospital for treatment. Some of the treatments commonly used to treat COPD exacerbations are:  · Antibiotic medicines. These may be used for severe exacerbations caused by a lung infection, such as pneumonia.  · Bronchodilators. These are inhaled medicines that expand the air passages and allow increased airflow.  · Steroid medicines. These act to reduce inflammation in the airways. They may be given with an inhaler, taken by mouth, or given through an IV tube inserted into one of your veins.  · Supplemental oxygen therapy.  · Airway clearing techniques, such as noninvasive ventilation (NIV) and positive expiratory pressure (PEP). These provide respiratory support through a mask or other noninvasive device. An example of this would be using a continuous positive airway pressure (CPAP) machine to improve delivery of oxygen into your lungs.  Follow these instructions at home:  Medicines  · Take over-the-counter and prescription medicines only as told by your health care provider. It is important to use correct technique with inhaled medicines.  · If you  were prescribed an antibiotic medicine or oral steroid, take it as told by your health care provider. Do not stop taking the medicine even if you start to feel better.  Lifestyle  · Eat a healthy diet.  · Exercise regularly.  · Get plenty of sleep.  · Avoid exposure to all substances that irritate the airway, especially to tobacco smoke.  · Wash your hands often with soap and water to reduce the risk of infection. If soap and water are not available, use hand .  · During flu season, avoid enclosed spaces that are crowded with people.  General instructions  · Drink enough fluid to keep your urine clear or pale yellow (unless you have a medical condition that requires fluid restriction).  · Use a cool mist vaporizer. This humidifies the air and makes it easier for you to clear your chest when you cough.  · If you have a home nebulizer and oxygen, continue to use them as told by your health care provider.  · Keep all follow-up visits as told by your health care provider. This is important.  How is this prevented?  · Stay up-to-date on pneumococcal and influenza (flu) vaccines. A flu shot is recommended every year to help prevent exacerbations.  · Do not use any products that contain nicotine or tobacco, such as cigarettes and e-cigarettes. Quitting smoking is very important in preventing COPD from getting worse and in preventing exacerbations from happening as often. If you need help quitting, ask your health care provider.  · Follow all instructions for pulmonary rehabilitation after a recent exacerbation. This can help prevent future exacerbations.  · Work with your health care provider to develop and follow an action plan. This tells you what steps to take when you experience certain symptoms.  Contact a health care provider if:  · You have a worsening of your regular COPD symptoms.  Get help right away if:  · You have worsening shortness of breath, even when resting.  · You have trouble talking.  · You  have severe chest pain.  · You cough up blood.  · You have a fever.  · You have weakness, vomit repeatedly, or faint.  · You feel confused.  · You are not able to sleep because of your symptoms.  · You have trouble doing daily activities.  Summary  · COPD exacerbations are episodes when breathing symptoms become much worse and require extra treatment above your normal treatment.  · Exacerbations can be severe and even life threatening. Frequent COPD exacerbations can cause further damage to your lungs.  · COPD exacerbations are usually triggered by infections such as the flu, colds, and even pneumonia.  · Treatment for this condition depends on the severity and cause of the symptoms. You may need to be admitted to a hospital for treatment.  · Quitting smoking is very important to prevent COPD from getting worse and to prevent exacerbations from happening as often.  This information is not intended to replace advice given to you by your health care provider. Make sure you discuss any questions you have with your health care provider.  Document Revised: 11/30/2018 Document Reviewed: 01/22/2018  [employer] Patient Education © 2021 [employer]

## 2021-06-11 NOTE — PROGRESS NOTES
Primary Care Provider  Rashida Piper APRN     Referring Provider  No ref. provider found     Chief Complaint  Follow-up, Asthma, Sleep Apnea, COPD, and Emphysema    Subjective          History of Presenting Illness  Patient is a 60-year-old  female, patient of Dr. Eller who has a history of chronic obstructive pulmonary disease who presents for follow-up visit today.  Patient states that since last visit her breathing is doing well.  Patient states that she does get short of breath that is worse with exertion, moderate severity, improved with rest.  Patient states that she did have a telehealth visit with her primary care provider yesterday for sinus infection in an ear infection and is currently on Amoxil.  Patient states she is has some decreased hearing in her right ear and feels like there is something in there.  Patient also states that she is wearing her BiPAP every night which helps her to sleep.  Patient states she is also taking Trelegy every day as prescribed and use albuterol inhaler as needed.  Patient denies fever, chills, night sweats, swollen glands in the head and neck, hemoptysis, purulent sputum production, dysphagia, chest pain, chest tightness, abdominal pain, nausea, vomiting, and diarrhea.  Patient also denies any myalgias, changes in sense of taste and/or smell, sore throat, any other coronavirus flulike symptoms.  Patient denies any leg swelling, orthopnea, paroxysmal nocturnal dyspnea.  Patient denies any morning headaches or excessive daytime sleepiness.  Patient states he is a former activities of daily living without difficulty.    Review of Systems   Constitutional: Negative for activity change, appetite change, chills, diaphoresis, fatigue, fever, unexpected weight gain and unexpected weight loss.        Negative for Insomnia   HENT: Positive for ear pain and hearing loss. Negative for congestion (Nasal), ear discharge, mouth sores, nosebleeds, postnasal drip, sore  throat, swollen glands and trouble swallowing.         Negative for Thrush  Negative for Hoarseness  Negative for Allergies/Hay Fever  Negative for Recent Head injury  Negative for Ear Fullness  Negative for Nasal or Sinus pain  Negative for Dry lips  Negative for Nasal discharge   Respiratory: Negative for apnea, cough, chest tightness, shortness of breath and wheezing.         Negative for Hemoptysis  Negative for Pleuritic pain   Cardiovascular: Negative for chest pain, palpitations and leg swelling.        Negative for Claudication  Negative for Cyanosis  Negative for Dyspnea on exertion   Gastrointestinal: Negative for abdominal pain, diarrhea, nausea, vomiting and GERD.   Musculoskeletal: Negative for joint swelling and myalgias.        Negative for Joint pain  Negative for Joint stiffness   Skin: Negative for color change, dry skin, pallor and rash.   Neurological: Negative for seizures, syncope, weakness, numbness and headache.   Hematological: Negative for adenopathy. Does not bruise/bleed easily.          Family History   Problem Relation Age of Onset   • Diabetes Brother         Social History     Socioeconomic History   • Marital status: Single     Spouse name: Not on file   • Number of children: Not on file   • Years of education: Not on file   • Highest education level: Not on file   Tobacco Use   • Smoking status: Former Smoker     Packs/day: 1.00     Years: 40.00     Pack years: 40.00     Types: Cigarettes     Quit date: 2011     Years since quitting: 10.4   • Smokeless tobacco: Never Used   • Tobacco comment: 25-50 PK YEARS   Vaping Use   • Vaping Use: Never used   Substance and Sexual Activity   • Alcohol use: Not Currently   • Drug use: Never        Past Medical History:   Diagnosis Date   • Arthritis    • Asthma    • Atypical lobular hyperplasia of right breast    • Chronic bronchitis (CMS/[facility])    • COPD (chronic obstructive pulmonary disease) (CMS/Formerly [facility])     INHALER   • Hypertension     MEDS  CONTROL   • SOB (shortness of breath)         Immunization History   Administered Date(s) Administered   • COVID-19 (PFIZER) 03/22/2021, 04/12/2021   • Flu Vaccine Quad PF >36MO 11/22/2016   • Influenza, Unspecified 12/01/2018       Allergies   Allergen Reactions   • Azithromycin Shortness Of Breath and Swelling   • Sulfamethoxazole-Trimethoprim Unknown - High Severity     Makes pt feel weird           Current Outpatient Medications:   •  albuterol sulfate HFA (Ventolin HFA) 108 (90 Base) MCG/ACT inhaler, Inhale 2 puffs Every 6 (Six) Hours As Needed for Wheezing., Disp: 18 g, Rfl: 4  •  alendronate (FOSAMAX) 70 MG tablet, TAKE 1 TABLET BY MOUTH ONCE WEEKLY BEFORE BREAKFAST, ON EMPTY STOMACH; REMAIN UPRIGHT FOR 30 MINUTES; TAKE WITH 8OZ OF WATER, Disp: , Rfl:   •  amoxicillin (AMOXIL) 875 MG tablet, , Disp: , Rfl:   •  azelastine (ASTELIN) 0.1 % nasal spray, SPRAY ONE SPRAY IN EACH NOSTRIL EVERY DAY, Disp: , Rfl:   •  Calcium Carbonate 1500 (600 Ca) MG tablet, Take 600 mg by mouth Daily., Disp: , Rfl:   •  Daliresp 500 MCG tablet tablet, Take 1 tablet by mouth Daily for 90 days., Disp: 90 tablet, Rfl: 4  •  EPINEPHrine (EPIPEN) 0.3 MG/0.3ML solution auto-injector injection, See Admin Instructions. Inject into the middle of the outer thigh and hold for 3 seconds as needed for severe allergic reaction then call 911 if used, Disp: , Rfl:   •  fluticasone (FLONASE) 50 MCG/ACT nasal spray, 2 sprays by Each Nare route Daily., Disp: , Rfl:   •  furosemide (LASIX) 20 MG tablet, Take 20 mg by mouth Daily., Disp: , Rfl:   •  meloxicam (MOBIC) 15 MG tablet, meloxicam 15 mg oral tablet take 1 tablet (15 mg) by oral route once daily   Active, Disp: , Rfl:   •  montelukast (SINGULAIR) 10 MG tablet, Take 1 tablet by mouth Every Night for 90 days., Disp: 90 tablet, Rfl: 4  •  omeprazole (priLOSEC) 40 MG capsule, Take 40 mg by mouth Daily., Disp: , Rfl:   •  Trelegy Ellipta 100-62.5-25 MCG/INH inhaler, Inhale 1 puff Daily for 90  "days., Disp: 3 each, Rfl: 4  •  vitamin D3 125 MCG (5000 UT) capsule capsule, Take 1 capsule by mouth Daily., Disp: , Rfl:   •  cyclobenzaprine (FLEXERIL) 10 MG tablet, take ONE-HALF TO ONE tablet BY MOUTH THREE TIMES DAILY AS NEEDED, Disp: , Rfl:      Objective     Physical Exam   Vital Signs Reviewed  WDWN, Alert, NAD.    HEENT:  PERRL, EOMI.  OP, nares clear, no sinus tenderness.  Right ear bulging tympanic membrane, no drainage.  Left ear tympanic membrane normal.  Neck:  Supple, no JVD, no thyromegaly  Lymph: no axillary, cervical, supraclavicular lymphadenopathy noted bilaterally  Chest:  good aeration, clear to auscultation bilaterally, tympanic to percussion bilaterally, no work of breathing noted  CV: RRR, no MGR, pulses 2+, equal.  Abd:  Soft, NT, ND, + BS, no HSM  EXT:  no clubbing, no cyanosis, no edema, no joint tenderness  Neuro:  A&Ox3, CN grossly intact, no focal deficits.  Skin: No rashes or lesions noted.    Vital Signs:   /65 (BP Location: Right arm, Patient Position: Sitting, Cuff Size: Adult)   Pulse 78   Temp 98.1 °F (36.7 °C) (Oral)   Resp 16   Ht (!) 5.5 cm (2.17\")   Wt 85.3 kg (188 lb)   SpO2 91% Comment: room air  BMI 17655.72 kg/m²         Result Review :   I have personally reviewed Dr. Bonds's last office visit note.       Assessment and Plan      Assessment:  1.  COPD without acute exacerbation patient on triple inhaler therapy.  2.  Obstructive sleep apnea, patient nightly BiPAP.  3.  Otitis media, right ear.  Patient currently on Amoxil prescribed by her PCP.  4.  Hearing loss, right ear.  5.  Tobacco abuse of cigarettes in remission.  Patient already enrolled in lung cancer screening.  Patient due for repeat low-dose chest CT scan in August 2021.  6.  Hypoxemic respiratory failure, chronic.  Patient on supplemental oxygen.    Plan:  1.  Patient to continue Trelegy Ellipta inhaler 1 puff once daily as prescribed rinse mouth out after each use.  Continue Daliresp and " Singulair as prescribed.  2. Patient to continue albuterol inhaler as needed.  3.Patient reports decreased hearing in right ear.  Patient currently on Amoxil for ear infection and sinus infection per patient report.  I will refer patient to ENT for further evaluation of decreased hearing in right ear.  4.  Patient will be due for repeat low-dose chest CT scan in August 2021.  Patient is already enrolled in lung cancer screening program.  5.  For obstructive sleep apnea patient continue BiPAP at current settings and clean mask and tubing daily.  6.  Patient to call the office, 911, or go to the ER with new or worsening symptoms.  7.  Patient reports he is up-to-date with his flu, pneumonia, and Covid vaccines.  Patient is advised to continue to follow CDC recommendations such as social distancing wearing a mask and washing hands for at least 20 seconds.  8.  Continue supplemental oxygen to keep SPO2 89% above.  9.  Patient to follow-up in 6 months with Dr. Bonds, sooner if needed.    Follow Up   Return in about 6 months (around 12/11/2021), or if symptoms worsen or fail to improve, for Recheck.  Patient was given instructions and counseling regarding her condition or for health maintenance advice. Please see specific information pulled into the AVS if appropriate.

## 2021-10-11 ENCOUNTER — TELEPHONE (OUTPATIENT)
Dept: PULMONOLOGY | Facility: CLINIC | Age: 61
End: 2021-10-11

## 2021-10-11 NOTE — TELEPHONE ENCOUNTER
PT CALLED, NEED AN MA TO CALL [facility] FOR [facility] PA FOR TRILOGY AND DALIRESP PH# FOR LUIS MANUEL [phone]    TO WHO,EVER DOES THIS, PT SAID YOU CAN CALL HER FOR ANY OTHER QUESTIONS

## 2021-10-14 NOTE — TELEPHONE ENCOUNTER
Attempted to call, was on hold for over 20 minutes. Had to hang up before getting to speak with anyone. Will attempt again at a later time.

## 2021-10-15 NOTE — TELEPHONE ENCOUNTER
Called the patients pharmacy and they stated that the medications in question were out for delivery and that on their end it was not showing that a PA was needed and that also there was no co-pay for either. I called the patient to inform and she verbalized understanding. I told her to call us if for some reason she does not receive the medications.

## 2022-04-12 ENCOUNTER — OFFICE VISIT (OUTPATIENT)
Dept: PULMONOLOGY | Facility: CLINIC | Age: 62
End: 2022-04-12

## 2022-04-12 DIAGNOSIS — J44.9 CHRONIC OBSTRUCTIVE PULMONARY DISEASE, UNSPECIFIED COPD TYPE: Primary | ICD-10-CM

## 2022-04-12 DIAGNOSIS — Z87.891 PERSONAL HISTORY OF NICOTINE DEPENDENCE: ICD-10-CM

## 2022-04-12 DIAGNOSIS — J96.11 HYPOXEMIC RESPIRATORY FAILURE, CHRONIC: ICD-10-CM

## 2022-04-12 PROCEDURE — 99213 OFFICE O/P EST LOW 20 MIN: CPT | Performed by: NURSE PRACTITIONER

## 2022-04-12 RX ORDER — ALBUTEROL SULFATE 90 UG/1
2 AEROSOL, METERED RESPIRATORY (INHALATION) EVERY 6 HOURS PRN
Qty: 18 G | Refills: 4 | Status: SHIPPED | OUTPATIENT
Start: 2022-04-12 | End: 2022-12-15 | Stop reason: SDUPTHER

## 2022-04-12 RX ORDER — FLUTICASONE PROPIONATE 50 MCG
2 SPRAY, SUSPENSION (ML) NASAL DAILY
Qty: 11.1 ML | Refills: 5 | Status: SHIPPED | OUTPATIENT
Start: 2022-04-12

## 2022-04-12 RX ORDER — MONTELUKAST SODIUM 10 MG/1
10 TABLET ORAL NIGHTLY
Qty: 90 TABLET | Refills: 4 | Status: SHIPPED | OUTPATIENT
Start: 2022-04-12 | End: 2022-07-11

## 2022-04-12 RX ORDER — ROFLUMILAST 500 UG/1
500 TABLET ORAL DAILY
Qty: 90 TABLET | Refills: 3 | Status: SHIPPED | OUTPATIENT
Start: 2022-04-12 | End: 2022-09-20 | Stop reason: SDUPTHER

## 2022-04-12 NOTE — PROGRESS NOTES
Primary Care Provider  Rashida Piper APRN   Referring Provider  No ref. provider found    Patient Complaint  Follow-up (Has questions about CPAP) and Med Refill      SUBJECTIVE    History of Presenting Illness  Isis Mars is a pleasant 61 y.o. female who presents to [facility] PULMONARY [facility]ARE MEDICINE for a phone visit today.  Patient's last office visit was 2021.  Patient has a diagnosis of nicotine dependence in remission as well as COPD and obstructive sleep apnea on BiPAP.  Patient states she is up-to-date on her vaccines.  Patient is a former smoker quit in .  Patient's last CT was 2020.  Patient states that overall she is doing very well she continues to be on Trelegy albuterol inhaler Daliresp and Singulair.  Last CT scan was 2020.  Patient states she is not been on the steroid antibiotics for her lungs.    Denies dyspnea, coughing, wheezing, headaches, chest pain, weight loss or hemoptysis. Denies fevers, chills and night sweats. Isis Mars is able to perform ADLs without difficulties and denies any swollen glands/lymph nodes in the head or neck.    I have personally reviewed the review of systems, past family, social, medical and surgical histories; and agree with their findings.    Review of Systems   Constitutional: Negative.    HENT: Negative.    Eyes: Negative.    Respiratory: Negative.    Cardiovascular: Negative.    Musculoskeletal: Negative.    Skin: Negative.         Family History   Problem Relation Age of Onset   • Diabetes Brother         Social History     Socioeconomic History   • Marital status: Single   Tobacco Use   • Smoking status: Former Smoker     Packs/day: 1.00     Years: 40.00     Pack years: 40.00     Types: Cigarettes     Quit date:      Years since quittin.2   • Smokeless tobacco: Never Used   • Tobacco comment: 25-50 PK YEARS   Vaping Use   • Vaping Use: Never used   Substance and Sexual Activity   •  Alcohol use: Not Currently   • Drug use: Never        Past Medical History:   Diagnosis Date   • Arthritis    • Asthma    • Atypical lobular hyperplasia of right breast    • Chronic bronchitis (HCC)    • COPD (chronic obstructive pulmonary disease) ([facility])     INHALER   • Hypertension     MEDS CONTROL   • SOB (shortness of breath)         Immunization History   Administered Date(s) Administered   • COVID-19 (PFIZER) PURPLE CAP 03/22/2021, 04/12/2021, 12/02/2021   • Flu Vaccine Quad PF >36MO 11/22/2016   • Influenza, Unspecified 12/01/2018       Allergies   Allergen Reactions   • Azithromycin Shortness Of Breath and Swelling   • Sulfamethoxazole-Trimethoprim Unknown - High Severity     Makes pt feel weird           Current Outpatient Medications:   •  albuterol sulfate HFA (Ventolin HFA) 108 (90 Base) MCG/ACT inhaler, Inhale 2 puffs Every 6 (Six) Hours As Needed for Wheezing., Disp: 18 g, Rfl: 4  •  alendronate (FOSAMAX) 70 MG tablet, TAKE 1 TABLET BY MOUTH ONCE WEEKLY BEFORE BREAKFAST, ON EMPTY STOMACH; REMAIN UPRIGHT FOR 30 MINUTES; TAKE WITH 8OZ OF WATER, Disp: , Rfl:   •  azelastine (ASTELIN) 0.1 % nasal spray, SPRAY ONE SPRAY IN EACH NOSTRIL EVERY DAY, Disp: , Rfl:   •  Calcium Carbonate 1500 (600 Ca) MG tablet, Take 600 mg by mouth Daily., Disp: , Rfl:   •  cyclobenzaprine (FLEXERIL) 10 MG tablet, take ONE-HALF TO ONE tablet BY MOUTH THREE TIMES DAILY AS NEEDED, Disp: , Rfl:   •  Daliresp 500 MCG tablet tablet, Take 1 tablet by mouth Daily for 90 days., Disp: 90 tablet, Rfl: 3  •  EPINEPHrine (EPIPEN) 0.3 MG/0.3ML solution auto-injector injection, See Admin Instructions. Inject into the middle of the outer thigh and hold for 3 seconds as needed for severe allergic reaction then call 911 if used, Disp: , Rfl:   •  fluticasone (FLONASE) 50 MCG/ACT nasal spray, 2 sprays by Each Nare route Daily., Disp: 11.1 mL, Rfl: 5  •  furosemide (LASIX) 20 MG tablet, Take 20 mg by mouth Daily., Disp: , Rfl:   •  meloxicam  (MOBIC) 15 MG tablet, meloxicam 15 mg oral tablet take 1 tablet (15 mg) by oral route once daily   Active, Disp: , Rfl:   •  montelukast (SINGULAIR) 10 MG tablet, Take 1 tablet by mouth Every Night for 90 days., Disp: 90 tablet, Rfl: 4  •  omeprazole (priLOSEC) 40 MG capsule, Take 40 mg by mouth Daily., Disp: , Rfl:   •  vitamin D3 125 MCG (5000 UT) capsule capsule, Take 1 capsule by mouth Daily., Disp: , Rfl:   •  Fluticasone-Umeclidin-Vilant (Trelegy Ellipta) 200-62.5-25 MCG/INH inhaler, Inhale 1 puff Daily., Disp: 1 each, Rfl: 5       OBJECTIVE    Vital Signs   There were no vitals taken for this visit.    Physical Exam   No physical exam performed today due to telephone visit.    Results Review  I have personally reviewed the office note of Esthela Johnson from 6/11/2021.  As well as CT scan from 8/14/2020.      ASSESSMENT & PLAN    Patient Active Problem List   Diagnosis   • Hearing loss of right ear   • Chronic obstructive pulmonary disease (HCC)   • Hypoxemic respiratory failure, chronic (HCC)   • Obstructive sleep apnea       Diagnoses and all orders for this visit:    1. Chronic obstructive pulmonary disease, unspecified COPD type (HCC) (Primary)  -     albuterol sulfate HFA (Ventolin HFA) 108 (90 Base) MCG/ACT inhaler; Inhale 2 puffs Every 6 (Six) Hours As Needed for Wheezing.  Dispense: 18 g; Refill: 4  -     Daliresp 500 MCG tablet tablet; Take 1 tablet by mouth Daily for 90 days.  Dispense: 90 tablet; Refill: 3  -     fluticasone (FLONASE) 50 MCG/ACT nasal spray; 2 sprays by Each Nare route Daily.  Dispense: 11.1 mL; Refill: 5  -     montelukast (SINGULAIR) 10 MG tablet; Take 1 tablet by mouth Every Night for 90 days.  Dispense: 90 tablet; Refill: 4  -     Fluticasone-Umeclidin-Vilant (Trelegy Ellipta) 200-62.5-25 MCG/INH inhaler; Inhale 1 puff Daily.  Dispense: 1 each; Refill: 5    2. Hypoxemic respiratory failure, chronic (HCC)  -     albuterol sulfate HFA (Ventolin HFA) 108 (90 Base) MCG/ACT  inhaler; Inhale 2 puffs Every 6 (Six) Hours As Needed for Wheezing.  Dispense: 18 g; Refill: 4  -     Daliresp 500 MCG tablet tablet; Take 1 tablet by mouth Daily for 90 days.  Dispense: 90 tablet; Refill: 3  -     fluticasone (FLONASE) 50 MCG/ACT nasal spray; 2 sprays by Each Nare route Daily.  Dispense: 11.1 mL; Refill: 5  -     montelukast (SINGULAIR) 10 MG tablet; Take 1 tablet by mouth Every Night for 90 days.  Dispense: 90 tablet; Refill: 4    3. Personal history of nicotine dependence  -      CT Chest Low Dose Cancer Screening WO; Future           Plan:  We will schedule patient for low-dose CT screening in July with a follow-up appointment to review results and assess patient lung status.  Courage patient to call sooner for appointment if needed.  Patient to continue with her current medications refills today provided encouraged to rinse mouth out after each use to avoid oral thrush    Smoking status: Former  Vaccination status: Up-to-date  Medications personally reviewed    Follow Up  Return in about 3 months (around 7/18/2022).    Patient was given instructions and counseling regarding her condition or for health maintenance advice. Please see specific information pulled into the AVS if appropriate.

## 2022-05-16 ENCOUNTER — HOSPITAL ENCOUNTER (OUTPATIENT)
Dept: CT IMAGING | Facility: HOSPITAL | Age: 62
Discharge: HOME | End: 2022-05-16
Admitting: NURSE PRACTITIONER

## 2022-05-16 DIAGNOSIS — R91.1 PULMONARY NODULE: Primary | ICD-10-CM

## 2022-05-16 DIAGNOSIS — Z87.891 PERSONAL HISTORY OF NICOTINE DEPENDENCE: ICD-10-CM

## 2022-05-16 PROCEDURE — 71271 CT THORAX LUNG CANCER SCR C-: CPT

## 2022-05-18 ENCOUNTER — TELEPHONE (OUTPATIENT)
Dept: PULMONOLOGY | Facility: CLINIC | Age: 62
End: 2022-05-18

## 2022-05-18 NOTE — TELEPHONE ENCOUNTER
Spoke to the patient. She asked me to re-read her the results from her CT that Estela Vyas had us call her about today.

## 2022-07-12 ENCOUNTER — OFFICE VISIT (OUTPATIENT)
Dept: PULMONOLOGY | Facility: CLINIC | Age: 62
End: 2022-07-12

## 2022-07-12 VITALS
HEIGHT: 65 IN | BODY MASS INDEX: 32.99 KG/M2 | DIASTOLIC BLOOD PRESSURE: 73 MMHG | HEART RATE: 72 BPM | WEIGHT: 198 LBS | OXYGEN SATURATION: 92 % | SYSTOLIC BLOOD PRESSURE: 117 MMHG | RESPIRATION RATE: 18 BRPM | TEMPERATURE: 98.6 F

## 2022-07-12 DIAGNOSIS — J44.9 CHRONIC OBSTRUCTIVE PULMONARY DISEASE, UNSPECIFIED COPD TYPE: ICD-10-CM

## 2022-07-12 DIAGNOSIS — J96.11 HYPOXEMIC RESPIRATORY FAILURE, CHRONIC: ICD-10-CM

## 2022-07-12 PROCEDURE — 99213 OFFICE O/P EST LOW 20 MIN: CPT | Performed by: NURSE PRACTITIONER

## 2022-07-12 RX ORDER — PREDNISONE 10 MG/1
10 TABLET ORAL DAILY
COMMUNITY
Start: 2022-07-07 | End: 2022-12-15

## 2022-07-12 RX ORDER — LEVOCETIRIZINE DIHYDROCHLORIDE 5 MG/1
TABLET, FILM COATED ORAL
COMMUNITY
Start: 2022-07-07

## 2022-07-12 RX ORDER — MECLIZINE HCL 12.5 MG/1
TABLET ORAL
COMMUNITY
Start: 2022-06-22

## 2022-07-12 NOTE — PROGRESS NOTES
Primary Care Provider  Rashida Piper APRN   Referring Provider  No ref. provider found    Patient Complaint  Follow-up and COPD      SUBJECTIVE    History of Presenting Illness  Isis Mars is a pleasant 62 y.o. female who presents to [facility] PULMONARY & CRITICAL CARE MEDICINE for follow-up appointment.  I last saw the patient 4/12/2022.  Patient has diagnosis of COPD, obstructive sleep apnea on BiPAP.  Patient is a former smoker and is up-to-date on her vaccines for COVID and flu.  Patient states she is doing very well on Trelegy at this time.  In addition she is also on albuterol inhaler Daliresp and Singulair.  Reviewed with patient her CT scan from May and she is scheduled for follow-up in November.  At present time patient states she does have some shortness of air with exertion is of moderate severity.  Patient states she will rest and recovers easily.    Denies coughing, wheezing, headaches, chest pain, weight loss or hemoptysis. Denies fevers, chills and night sweats. Isis Mars is able to perform ADLs without difficulties and denies any swollen glands/lymph nodes in the head or neck.    I have personally reviewed the review of systems, past family, social, medical and surgical histories; and agree with their findings.    Review of Systems   Constitutional: Negative.  Negative for chills, fatigue and fever.   HENT: Negative.    Eyes: Negative.    Respiratory: Positive for shortness of breath. Negative for cough and wheezing.    Cardiovascular: Negative.  Negative for chest pain, palpitations and leg swelling.   Musculoskeletal: Negative.    Skin: Negative.         Family History   Problem Relation Age of Onset   • Diabetes Brother         Social History     Socioeconomic History   • Marital status: Single   Tobacco Use   • Smoking status: Former Smoker     Packs/day: 1.00     Years: 40.00     Pack years: 40.00     Types: Cigarettes     Quit date: 2011     Years since  quittin.5   • Smokeless tobacco: Never Used   • Tobacco comment: 25-50 PK YEARS   Vaping Use   • Vaping Use: Never used   Substance and Sexual Activity   • Alcohol use: Not Currently   • Drug use: Never        Past Medical History:   Diagnosis Date   • Arthritis    • Asthma    • Atypical lobular hyperplasia of right breast    • Chronic bronchitis (HCC)    • COPD (chronic obstructive pulmonary disease) ([facility])     INHALER   • Hypertension     MEDS CONTROL   • SOB (shortness of breath)         Immunization History   Administered Date(s) Administered   • COVID-19 (PFIZER) PURPLE CAP 2021, 2021, 2021   • Flu Vaccine Quad PF >36MO 2016   • Influenza, Unspecified 2018, 10/11/2021       Allergies   Allergen Reactions   • Azithromycin Shortness Of Breath and Swelling   • Doxycycline Rash   • Sulfamethoxazole-Trimethoprim Unknown - High Severity     Makes pt feel weird           Current Outpatient Medications:   •  albuterol sulfate HFA (Ventolin HFA) 108 (90 Base) MCG/ACT inhaler, Inhale 2 puffs Every 6 (Six) Hours As Needed for Wheezing., Disp: 18 g, Rfl: 4  •  alendronate (FOSAMAX) 70 MG tablet, TAKE 1 TABLET BY MOUTH ONCE WEEKLY BEFORE BREAKFAST, ON EMPTY STOMACH; REMAIN UPRIGHT FOR 30 MINUTES; TAKE WITH 8OZ OF WATER, Disp: , Rfl:   •  azelastine (ASTELIN) 0.1 % nasal spray, SPRAY ONE SPRAY IN EACH NOSTRIL EVERY DAY, Disp: , Rfl:   •  Calcium Carbonate 1500 (600 Ca) MG tablet, Take 600 mg by mouth Daily., Disp: , Rfl:   •  cyclobenzaprine (FLEXERIL) 10 MG tablet, take ONE-HALF TO ONE tablet BY MOUTH THREE TIMES DAILY AS NEEDED, Disp: , Rfl:   •  EPINEPHrine (EPIPEN) 0.3 MG/0.3ML solution auto-injector injection, See Admin Instructions. Inject into the middle of the outer thigh and hold for 3 seconds as needed for severe allergic reaction then call 911 if used, Disp: , Rfl:   •  fluticasone (FLONASE) 50 MCG/ACT nasal spray, 2 sprays by Each Nare route Daily., Disp: 11.1 mL, Rfl: 5  •   "Fluticasone-Umeclidin-Vilant (Trelegy Ellipta) 200-62.5-25 MCG/INH inhaler, Inhale 1 puff Daily., Disp: 1 each, Rfl: 5  •  furosemide (LASIX) 20 MG tablet, Take 20 mg by mouth Daily., Disp: , Rfl:   •  levocetirizine (XYZAL) 5 MG tablet, , Disp: , Rfl:   •  meclizine (ANTIVERT) 12.5 MG tablet, TAKE ONE TABLET BY MOUTH EVERY 8 HOURS AS NEEDED FOR FOR DIZZINESS - HOLD OTHER FOR ALLERGY MEDS WHEN IN USE, Disp: , Rfl:   •  meloxicam (MOBIC) 15 MG tablet, meloxicam 15 mg oral tablet take 1 tablet (15 mg) by oral route once daily   Active, Disp: , Rfl:   •  omeprazole (priLOSEC) 40 MG capsule, Take 40 mg by mouth Daily., Disp: , Rfl:   •  predniSONE (DELTASONE) 10 MG tablet, Take 10 mg by mouth Daily., Disp: , Rfl:   •  vitamin D3 125 MCG (5000 UT) capsule capsule, Take 1 capsule by mouth Daily., Disp: , Rfl:   •  Daliresp 500 MCG tablet tablet, Take 1 tablet by mouth Daily for 90 days., Disp: 90 tablet, Rfl: 3  •  montelukast (SINGULAIR) 10 MG tablet, Take 1 tablet by mouth Every Night for 90 days., Disp: 90 tablet, Rfl: 4       OBJECTIVE    Vital Signs   /73 (BP Location: Right arm, Patient Position: Sitting, Cuff Size: Large Adult)   Pulse 72   Temp 98.6 °F (37 °C)   Resp 18   Ht 165.1 cm (65\")   Wt 89.8 kg (198 lb)   SpO2 92%   BMI 32.95 kg/m²     Physical Exam  Vitals reviewed.   Constitutional:       Appearance: Normal appearance. She is obese.   HENT:      Head: Normocephalic and atraumatic.      Nose: Nose normal.      Mouth/Throat:      Mouth: Mucous membranes are moist.      Pharynx: Oropharynx is clear.   Eyes:      Extraocular Movements: Extraocular movements intact.      Conjunctiva/sclera: Conjunctivae normal.      Pupils: Pupils are equal, round, and reactive to light.   Cardiovascular:      Rate and Rhythm: Normal rate and regular rhythm.      Pulses: Normal pulses.      Heart sounds: Normal heart sounds.   Pulmonary:      Effort: Pulmonary effort is normal. No respiratory distress.      " Breath sounds: Normal breath sounds. No stridor. No wheezing, rhonchi or rales.   Chest:      Chest wall: No tenderness.   Abdominal:      General: Bowel sounds are normal.   Musculoskeletal:         General: Normal range of motion.      Cervical back: Normal range of motion and neck supple.   Skin:     General: Skin is warm and dry.   Neurological:      General: No focal deficit present.      Mental Status: She is alert and oriented to person, place, and time.   Psychiatric:         Mood and Affect: Mood normal.         Behavior: Behavior normal.          Results Review  I have personally reviewed the CT scan from 2022 with the following:    CT Chest Low Dose Cancer Screening WO [[id number]] (Order [id number])  Order  Status: Final result             Appointment Information      Display Notes    V-AS                   PACS Images     Radiology Images    Study Result    Narrative & Impression   PROCEDURE:  CT CHEST LOW DOSE CANCER SCREENING WO     COMPARISON: [facility], CT, CT LOW DOSE CHEST SCREENING, 2020, 11:38.     REASON FOR SCREENING:     Patient is 61 years of age, asymptomatic, and has a smoking history of more   than 30 pack years.  SMOKING STATUS:      Former smoker.  Years since quittin YEARS                 SCREENING VISIT:       Year 2.                   TECHNIQUE:    Axial unenhanced LDCT images from the apices through mid-kidney were obtained.   Evaluation of solid organs and vascular structures is suboptimal due to the lack of IV contrast.   Imaging was performed on a [employer] 128 CT scanner.     RADIATION:      CT Dose Index Vol (CTDIvol):      3.085  mGy               Dose Length Product (DLP):        139.1  mGy-cm     DIAGNOSTIC QUALITY:             Satisfactory     LUNG-RADS CLASSIFICATION:            Lung rads category 3-probably benign     FINDINGS:          There is a new 4 mm nodule medially adjacent to the pleura in the left lower lobe  posteromedially.    (Image 71).  There are no additional pulmonary nodules or masses identified.  There is some   scarring in the left lung base peripherally which is unchanged.  There is no mediastinal or hilar   lymphadenopathy.  There are no coronary artery calcifications.  The upper abdomen is normal.        IMPRESSION:                 1. Lung rads category 3-probably benign.  New 4 mm pulmonary nodule in the left lower lobe   posterior medially.  Recommend a follow-up low-dose chest CT in 6 months               Priyank Baker MD         Electronically Signed and Approved By: Priyank Baker MD on 5/16/2022 at 15:48                ASSESSMENT & PLAN    Patient Active Problem List   Diagnosis   • Hearing loss of right ear   • Chronic obstructive pulmonary disease (HCC)   • Hypoxemic respiratory failure, chronic (HCC)   • Obstructive sleep apnea       Diagnoses and all orders for this visit:    1. Chronic obstructive pulmonary disease, unspecified COPD type (HCC)  -     Fluticasone-Umeclidin-Vilant (Trelegy Ellipta) 200-62.5-25 MCG/INH inhaler; Inhale 1 puff Daily.  Dispense: 1 each; Refill: 5    2. Hypoxemic respiratory failure, chronic (HCC)           Plan:  Patient to follow-up with a CT in November 2022.  Recommend a follow-up appointment in December to review results.  Patient to continue with Trelegy albuterol Daliresp and Singulair at this time.  Patient struck to rinse her mouth out after each use to prevent oral thrush.  Patient return to office sooner if needed    Smoking status: Former  Vaccination status: Up to date with COVID and flu  Medications personally reviewed    Follow Up  Return in about 5 months (around 12/12/2022).    Patient was given instructions and counseling regarding her condition or for health maintenance advice. Please see specific information pulled into the AVS if appropriate.

## 2022-08-23 ENCOUNTER — TRANSCRIBE ORDERS (OUTPATIENT)
Dept: ADMINISTRATIVE | Facility: HOSPITAL | Age: 62
End: 2022-08-23

## 2022-08-23 DIAGNOSIS — Z12.31 SCREENING MAMMOGRAM FOR BREAST CANCER: Primary | ICD-10-CM

## 2022-09-20 ENCOUNTER — TELEPHONE (OUTPATIENT)
Dept: PULMONOLOGY | Facility: CLINIC | Age: 62
End: 2022-09-20

## 2022-09-20 ENCOUNTER — OFFICE VISIT (OUTPATIENT)
Dept: PULMONOLOGY | Facility: CLINIC | Age: 62
End: 2022-09-20

## 2022-09-20 VITALS
OXYGEN SATURATION: 90 % | TEMPERATURE: 98.4 F | RESPIRATION RATE: 18 BRPM | BODY MASS INDEX: 33.02 KG/M2 | HEIGHT: 65 IN | HEART RATE: 91 BPM | SYSTOLIC BLOOD PRESSURE: 119 MMHG | WEIGHT: 198.2 LBS | DIASTOLIC BLOOD PRESSURE: 83 MMHG

## 2022-09-20 DIAGNOSIS — Z23 FLU VACCINE NEED: Primary | ICD-10-CM

## 2022-09-20 DIAGNOSIS — K21.9 GASTROESOPHAGEAL REFLUX DISEASE, UNSPECIFIED WHETHER ESOPHAGITIS PRESENT: ICD-10-CM

## 2022-09-20 DIAGNOSIS — R06.00 DYSPNEA, UNSPECIFIED TYPE: ICD-10-CM

## 2022-09-20 DIAGNOSIS — G47.33 OSA (OBSTRUCTIVE SLEEP APNEA): ICD-10-CM

## 2022-09-20 DIAGNOSIS — J44.9 CHRONIC OBSTRUCTIVE PULMONARY DISEASE, UNSPECIFIED COPD TYPE: Primary | ICD-10-CM

## 2022-09-20 DIAGNOSIS — Z23 ENCOUNTER FOR IMMUNIZATION: ICD-10-CM

## 2022-09-20 DIAGNOSIS — F17.201 TOBACCO ABUSE, IN REMISSION: ICD-10-CM

## 2022-09-20 DIAGNOSIS — U07.1 COVID-19 VIRUS DETECTED: ICD-10-CM

## 2022-09-20 DIAGNOSIS — J96.11 HYPOXEMIC RESPIRATORY FAILURE, CHRONIC: ICD-10-CM

## 2022-09-20 PROCEDURE — 99214 OFFICE O/P EST MOD 30 MIN: CPT | Performed by: NURSE PRACTITIONER

## 2022-09-20 PROCEDURE — G0008 ADMIN INFLUENZA VIRUS VAC: HCPCS | Performed by: NURSE PRACTITIONER

## 2022-09-20 PROCEDURE — 90686 IIV4 VACC NO PRSV 0.5 ML IM: CPT | Performed by: NURSE PRACTITIONER

## 2022-09-20 RX ORDER — BUDESONIDE, GLYCOPYRROLATE, AND FORMOTEROL FUMARATE 160; 9; 4.8 UG/1; UG/1; UG/1
2 AEROSOL, METERED RESPIRATORY (INHALATION) 2 TIMES DAILY
Qty: 1 EACH | Refills: 11 | Status: SHIPPED | OUTPATIENT
Start: 2022-09-20 | End: 2022-10-20

## 2022-09-20 RX ORDER — ROFLUMILAST 500 UG/1
500 TABLET ORAL DAILY
Qty: 90 TABLET | Refills: 3 | Status: SHIPPED | OUTPATIENT
Start: 2022-09-20 | End: 2022-12-19

## 2022-09-20 NOTE — TELEPHONE ENCOUNTER
Please request copy of ER records and imaging studies from [facility] to be faxed to our office as soon as possible. Thank you.

## 2022-09-20 NOTE — PATIENT INSTRUCTIONS
Chronic Obstructive Pulmonary Disease Exacerbation  Chronic obstructive pulmonary disease (COPD) is a long-term (chronic) condition that affects the lungs. COPD is a general term that can be used to describe many different lung problems that cause lung inflammation and limit airflow, including chronic bronchitis and emphysema. COPD exacerbations are episodes when breathing symptoms flare up, become much worse, and require extra treatment.  COPD exacerbations are usually caused by infections. Without treatment, COPD exacerbations can be severe and even life threatening. Frequent COPD exacerbations can cause further damage to the lungs.  What are the causes?  This condition may be caused by:  Respiratory infections, including viral and bacterial infections.  Exposure to smoke.  Exposure to air pollution, chemical fumes, or dust.  Things that can cause an allergic reaction (allergens).  Not taking your usual COPD medicines as directed.  Underlying medical problems, such as congestive heart failure or infections not involving the lungs.  In many cases, the cause of this condition is not known.  What increases the risk?  The following factors may make you more likely to develop this condition:  Smoking cigarettes.  Being an older adult.  Having frequent prior COPD exacerbations.  What are the signs or symptoms?  Symptoms of this condition include:  Increased coughing.  Increased production of mucus from your lungs.  Increased wheezing and shortness of breath.  Rapid or labored breathing.  Chest tightness.  Less energy than usual.  Sleep disruption from symptoms.  Confusion  Increased sleepiness.  Often, these symptoms happen or get worse even with the use of medicines.  How is this diagnosed?  This condition is diagnosed based on:  Your medical history.  A physical exam.  You may also have tests, including:  A chest X-ray.  Blood tests.  Lung (pulmonary) function tests.  How is this treated?  Treatment for this condition  depends on the severity and cause of the symptoms. You may need to be admitted to a hospital for treatment. Some of the treatments commonly used to treat COPD exacerbations are:  Antibiotic medicines. These may be used for severe exacerbations caused by a lung infection, such as pneumonia.  Bronchodilators. These are inhaled medicines that expand the air passages and allow increased airflow. They may make your breathing more comfortable.  Steroid medicines. These act to reduce inflammation in the airways. They may be given with an inhaler, taken by mouth, or given through an IV tube inserted into one of your veins.  Supplemental oxygen therapy.  Airway clearing techniques, such as noninvasive ventilation (NIV) and positive expiratory pressure (PEP). These provide respiratory support through a mask or other noninvasive device. An example of this would be using a continuous positive airway pressure (CPAP) machine to improve delivery of oxygen into your lungs.  Follow these instructions at home:  Medicines  Take over-the-counter and prescription medicines only as told by your health care provider.  It is important to use correct technique with inhaled medicines.  If you were prescribed an antibiotic medicine or oral steroid, take it as told by your health care provider. Do not stop taking the medicine even if you start to feel better.  Lifestyle  Do not use any products that contain nicotine or tobacco. These products include cigarettes, chewing tobacco, and vaping devices, such as e-cigarettes. If you need help quitting, ask your health care provider.  Eat a healthy diet.  Exercise regularly.  Get enough sleep. Most adults need 7 or more hours per night.  Avoid exposure to all substances that irritate the airway, especially tobacco smoke.  Regularly wash your hands with soap and water for at least 20 seconds. If soap and water are not available, use hand . This may help prevent you from getting  infections.  During flu season, avoid enclosed spaces that are crowded with people.  General instructions  Drink enough fluid to keep your urine pale yellow, unless you have a medical condition that requires fluid restriction.  Use a cool mist vaporizer. This humidifies the air and makes it easier for you to clear your chest when you cough.  If you have a home nebulizer and oxygen, continue to use them as told by your health care provider.  Keep all follow-up visits. This is important.  How is this prevented?  Stay up-to-date on pneumococcal and flu (influenza) vaccines. A flu shot is recommended every year to help prevent exacerbations.  Quitting smoking is very important in preventing COPD from getting worse and in preventing exacerbations from happening as often.  Follow all instructions for pulmonary rehabilitation after a recent exacerbation. This can help prevent future exacerbations.  Work with your health care provider to develop and follow an action plan. This tells you what steps to take when you experience certain symptoms.  Contact a health care provider if:  You have a worsening of your regular COPD symptoms.  Get help right away if:  You have worsening shortness of breath, even when resting.  You have trouble talking.  You have severe chest pain.  You cough up blood.  You have a fever.  You have weakness, vomit repeatedly, or faint.  You feel confused.  You are not able to sleep because of your symptoms.  You have trouble doing daily activities.  These symptoms may represent a serious problem that is an emergency. Do not wait to see if the symptoms will go away. Get medical help right away. Call your local emergency services (911 in the [place] Do not drive yourself to the hospital.  Summary  COPD exacerbations are episodes when breathing symptoms become much worse and require extra treatment above your normal treatment.  Exacerbations can be severe and even life threatening. Frequent COPD exacerbations  can cause further damage to your lungs.  COPD exacerbations are usually triggered by infections such as the flu, colds, and even pneumonia.  Treatment for this condition depends on the severity and cause of the symptoms. You may need to be admitted to a hospital for treatment.  Quitting smoking is very important to prevent COPD from getting worse and to prevent exacerbations from happening as often.  This information is not intended to replace advice given to you by your health care provider. Make sure you discuss any questions you have with your health care provider.  Document Revised: 10/26/2021 Document Reviewed: 10/26/2021  Elsevier Patient Education © 2022 [employer]

## 2022-09-20 NOTE — PROGRESS NOTES
Primary Care Provider  Rashida Piper APRN     Referring Provider  No ref. provider found     Chief Complaint  Follow-up and Shortness of Breath (Oxygen 2 liters)    Subjective          History of Presenting Illness  Patient is a 62-year-old female, patient of Dr. Eller who presents for management of COPD who presents for an acute visit today.  Patient states that 3 weeks ago she was diagnosed with COVID-19 at [facility].  Patient states that she was sent home on home oxygen.  Patient states that she is on 2 L of oxygen per minute via nasal cannula as needed.  Patient states that she was treated with Paxlovid.  Unfortunately, patient does not have a copy of ER records in our office does not have a copy of ER report and imaging studies from [facility].  Patient states that she is feeling better since receiving treatment.  Patient states that her cough has improved.  Patient states that she still is short of breath that is worse with exertion, moderate in severity, and improved with rest.  Patient states that she is taken Trelegy Ellipta inhaler every day as prescribed, however does not feel it helps her as well anymore.  Patient would like to try different inhaler.  Patient states that she does have albuterol inhaler and DuoNeb nebulizer treatments to use as needed.  Patient states that she is also on daily Daliresp and needs a refill today.  Patient states that she is taking Astelin nasal spray, Flonase nasal spray, and Singulair.  Patient is on Prilosec for reflux.  Patient states that she is using her BiPAP machine at night and with naps which helps her to sleep.  Patient denies any morning headaches or excessive daytime sleepiness.  Patient denies fever, chills, night sweats, swollen glands in the head and neck, unintentional weight loss, hemoptysis, purulent sputum production, dysphagia, chest pain, palpitations, chest tightness, abdominal pain, nausea, vomiting, and diarrhea.   Patient also denies any myalgias, changes in sense of taste and/or smell, sore throat, any other coronavirus or flu-like symptoms.  Patient denies any leg swelling, orthopnea, paroxysmal nocturnal dyspnea.  Patient is able to perform activities of daily living.        Review of Systems   Constitutional: Negative for activity change, appetite change, chills, diaphoresis, fatigue, fever, unexpected weight gain and unexpected weight loss.        Negative for Insomnia   HENT: Negative for congestion (Nasal), mouth sores, nosebleeds, postnasal drip, sore throat, swollen glands and trouble swallowing.         Negative for Thrush  Negative for Hoarseness  Negative for Allergies/Hay Fever  Negative for Recent Head injury  Negative for Ear Fullness  Negative for Nasal or Sinus pain  Negative for Dry lips  Negative for Nasal discharge   Respiratory: Positive for cough (improved per pt report) and shortness of breath. Negative for apnea, chest tightness and wheezing.         Negative for Hemoptysis  Negative for Pleuritic pain   Cardiovascular: Negative for chest pain, palpitations and leg swelling.        Negative for Claudication  Negative for Cyanosis  Negative for Dyspnea on exertion   Gastrointestinal: Negative for abdominal pain, diarrhea, nausea, vomiting and GERD.   Musculoskeletal: Negative for joint swelling and myalgias.        Negative for Joint pain  Negative for Joint stiffness   Skin: Negative for color change, dry skin, pallor and rash.   Neurological: Negative for syncope, weakness and headache.   Hematological: Negative for adenopathy. Does not bruise/bleed easily.        Family History   Problem Relation Age of Onset   • Diabetes Brother         Social History     Socioeconomic History   • Marital status: Single   Tobacco Use   • Smoking status: Former Smoker     Packs/day: 1.00     Years: 40.00     Pack years: 40.00     Types: Cigarettes     Quit date:      Years since quittin.7   • Smokeless  tobacco: Never Used   • Tobacco comment: 25-50 PK YEARS   Vaping Use   • Vaping Use: Never used   Substance and Sexual Activity   • Alcohol use: Not Currently   • Drug use: Never        Past Medical History:   Diagnosis Date   • Arthritis    • Asthma    • Atypical lobular hyperplasia of right breast    • Chronic bronchitis (HCC)    • COPD (chronic obstructive pulmonary disease) (HCC)     INHALER   • Hypertension     MEDS CONTROL   • SOB (shortness of breath)         Immunization History   Administered Date(s) Administered   • COVID-19 (PFIZER) PURPLE CAP 03/22/2021, 04/12/2021, 12/02/2021   • Flu Vaccine Quad PF >36MO 11/22/2016   • FluLaval/Fluzone >6mos 09/20/2022   • Influenza, Unspecified 12/01/2018, 10/11/2021       Allergies   Allergen Reactions   • Azithromycin Shortness Of Breath and Swelling   • Doxycycline Rash   • Sulfamethoxazole-Trimethoprim Unknown - High Severity     Makes pt feel weird           Current Outpatient Medications:   •  albuterol sulfate HFA (Ventolin HFA) 108 (90 Base) MCG/ACT inhaler, Inhale 2 puffs Every 6 (Six) Hours As Needed for Wheezing., Disp: 18 g, Rfl: 4  •  alendronate (FOSAMAX) 70 MG tablet, TAKE 1 TABLET BY MOUTH ONCE WEEKLY BEFORE BREAKFAST, ON EMPTY STOMACH; REMAIN UPRIGHT FOR 30 MINUTES; TAKE WITH 8OZ OF WATER, Disp: , Rfl:   •  azelastine (ASTELIN) 0.1 % nasal spray, SPRAY ONE SPRAY IN EACH NOSTRIL EVERY DAY, Disp: , Rfl:   •  Calcium Carbonate 1500 (600 Ca) MG tablet, Take 600 mg by mouth Daily., Disp: , Rfl:   •  Daliresp 500 MCG tablet tablet, Take 1 tablet by mouth Daily for 90 days., Disp: 90 tablet, Rfl: 3  •  EPINEPHrine (EPIPEN) 0.3 MG/0.3ML solution auto-injector injection, See Admin Instructions. Inject into the middle of the outer thigh and hold for 3 seconds as needed for severe allergic reaction then call 911 if used, Disp: , Rfl:   •  fluticasone (FLONASE) 50 MCG/ACT nasal spray, 2 sprays by Each Nare route Daily., Disp: 11.1 mL, Rfl: 5  •  furosemide  "(LASIX) 20 MG tablet, Take 20 mg by mouth Daily., Disp: , Rfl:   •  levocetirizine (XYZAL) 5 MG tablet, , Disp: , Rfl:   •  meclizine (ANTIVERT) 12.5 MG tablet, TAKE ONE TABLET BY MOUTH EVERY 8 HOURS AS NEEDED FOR FOR DIZZINESS - HOLD OTHER FOR ALLERGY MEDS WHEN IN USE, Disp: , Rfl:   •  omeprazole (priLOSEC) 40 MG capsule, Take 40 mg by mouth Daily., Disp: , Rfl:   •  vitamin D3 125 MCG (5000 UT) capsule capsule, Take 1 capsule by mouth Daily., Disp: , Rfl:   •  Budeson-Glycopyrrol-Formoterol (Breztri Aerosphere) 160-9-4.8 MCG/ACT aerosol inhaler, Inhale 2 puffs 2 (Two) Times a Day for 30 days. Rinse mouth out after each use, Disp: 1 each, Rfl: 11  •  cyclobenzaprine (FLEXERIL) 10 MG tablet, take ONE-HALF TO ONE tablet BY MOUTH THREE TIMES DAILY AS NEEDED, Disp: , Rfl:   •  meloxicam (MOBIC) 15 MG tablet, meloxicam 15 mg oral tablet take 1 tablet (15 mg) by oral route once daily   Active, Disp: , Rfl:   •  montelukast (SINGULAIR) 10 MG tablet, Take 1 tablet by mouth Every Night for 90 days., Disp: 90 tablet, Rfl: 4  •  predniSONE (DELTASONE) 10 MG tablet, Take 10 mg by mouth Daily., Disp: , Rfl:      Objective     Physical Exam  Vital Signs:   WDWN, Alert, NAD.    HEENT:  PERRL, EOMI.  OP, nares clear, no sinus tenderness  Neck:  Supple, no JVD, no thyromegaly.  Lymph: no axillary, cervical, supraclavicular lymphadenopathy noted bilaterally  Chest:  good aeration, clear to auscultation bilaterally, tympanic to percussion bilaterally, no work of breathing noted  CV: RRR, no MGR, pulses 2+, equal.  Abd:  Soft, NT, ND, + BS, no HSM  EXT:  no clubbing, no cyanosis, no edema, no joint tenderness  Neuro:  A&Ox3, CN grossly intact, no focal deficits.  Skin: No rashes or lesions noted.    /83 (BP Location: Right arm, Patient Position: Sitting, Cuff Size: Adult)   Pulse 91   Temp 98.4 °F (36.9 °C) (Tympanic)   Resp 18   Ht 165.1 cm (65\")   Wt 89.9 kg (198 lb 3.2 oz)   SpO2 90% Comment: room air  BMI 32.98 kg/m² "         Result Review :   I have reviewed EstelaJO-ANN Castle last office visit note.    Procedures:         Assessment and Plan      Assessment:  1.  COPD.  Patient on triple inhaler therapy.  2.  Hypoxemic respiratory failure.  Patient is on supplemental oxygen.  3.  COVID-19 diagnosed 3 weeks ago at [facility] emergency room per patient report.  4.  Dyspnea.  5.  GERD.  Patient on PPI.  6.  Obstructive sleep apnea.  Patient is on nightly BiPAP.  7.  Tobacco abuse cigarettes in remission.  Patient is enrolled in lung cancer screening.  8.  Encounter for immunization.  Flu vaccine given to the patient in the office today.  9.  Seasonal allergies.        Plan:  1.  Will stop Trelegy.  Did offer to start patient on straight nebulizer treatments, however patient prefers to stay on an inhaler.  Will start patient on Breztri 2 puffs twice daily.  Patient is advised to rinse her mouth out after each use.  2.  Continue albuterol inhaler and DuoNeb nebulizer treatments as needed.  3.  Will request records of patient's ER visit from [facility] along with imaging studies to be faxed to her office.  4.  For dyspnea, Will order an echocardiogram, CBC, and CMP.  Patient reports that she recently had a chest x-ray, EKG, and labs completed at [facility].  Will request a copy of records.  5.  Continue oxygen to keep SPO2 at 89% and above.    6.  Continue BiPAP at current settings with oxygen bled in at night and with naps and clean mask and tubing daily.    7.  Continue Flonase nasal spray and Singulair.  8.  Continue Daliresp.  9.  Patient is scheduled to have a chest CT scan on 11/14/2022 at 1:00 PM.  10.  For  GERD,  patient to continue PPI.   Patient is also advised to sleep with the head of the bed elevated and do not eat 3-4 hours prior to bedtime.  11.  Vaccination status: Flu vaccine given to the patient in the office today.  Patient reports they are up-to-date with pneumonia and Covid  vaccines.  Patient is advised to continue to follow CDC recommendations such as social distancing wearing a mask and washing hands for at least 20 seconds.  12.  Smoking status: Patient is a former cigarette smoker.  Patient is enrolled in lung cancer screening.  Patient is scheduled to have a low-dose chest CT scan on 11/14/2022 at 1:00 PM.  13.  Patient to call the office, 911, or go to the ER with new or worsening symptoms.  14.  Follow-up in 6 to 8 weeks, sooner if needed.              Follow Up   Return for end of November with Esthela. .  Patient was given instructions and counseling regarding her condition or for health maintenance advice. Please see specific information pulled into the AVS if appropriate.

## 2022-10-17 ENCOUNTER — TELEPHONE (OUTPATIENT)
Dept: PULMONOLOGY | Facility: CLINIC | Age: 62
End: 2022-10-17

## 2022-10-17 DIAGNOSIS — R93.1 ABNORMAL ECHOCARDIOGRAM: Primary | ICD-10-CM

## 2022-10-21 ENCOUNTER — HOSPITAL ENCOUNTER (OUTPATIENT)
Dept: MAMMOGRAPHY | Facility: HOSPITAL | Age: 62
Discharge: HOME | End: 2022-10-21
Admitting: NURSE PRACTITIONER

## 2022-10-21 DIAGNOSIS — Z12.31 SCREENING MAMMOGRAM FOR BREAST CANCER: ICD-10-CM

## 2022-10-21 PROCEDURE — 77063 BREAST TOMOSYNTHESIS BI: CPT

## 2022-10-21 PROCEDURE — 77067 SCR MAMMO BI INCL CAD: CPT

## 2022-11-13 ENCOUNTER — TELEPHONE (OUTPATIENT)
Dept: PULMONOLOGY | Facility: CLINIC | Age: 62
End: 2022-11-13

## 2022-11-14 ENCOUNTER — HOSPITAL ENCOUNTER (OUTPATIENT)
Dept: CT IMAGING | Facility: HOSPITAL | Age: 62
Discharge: HOME | End: 2022-11-14
Admitting: NURSE PRACTITIONER

## 2022-11-14 DIAGNOSIS — R91.1 PULMONARY NODULE: Primary | ICD-10-CM

## 2022-11-14 DIAGNOSIS — R91.1 PULMONARY NODULE: ICD-10-CM

## 2022-11-14 PROCEDURE — 71250 CT THORAX DX C-: CPT

## 2022-12-09 ENCOUNTER — TELEPHONE (OUTPATIENT)
Dept: PULMONOLOGY | Facility: CLINIC | Age: 62
End: 2022-12-09

## 2022-12-09 NOTE — TELEPHONE ENCOUNTER
Received denial letter from [facility] for Albuterol Inhaler. Spoke with Merry at [facility]. Order changed to Ventolin Inhaler

## 2022-12-15 ENCOUNTER — OFFICE VISIT (OUTPATIENT)
Dept: PULMONOLOGY | Facility: CLINIC | Age: 62
End: 2022-12-15

## 2022-12-15 VITALS
TEMPERATURE: 98.4 F | OXYGEN SATURATION: 92 % | HEIGHT: 65 IN | BODY MASS INDEX: 34.47 KG/M2 | SYSTOLIC BLOOD PRESSURE: 133 MMHG | DIASTOLIC BLOOD PRESSURE: 71 MMHG | RESPIRATION RATE: 18 BRPM | WEIGHT: 206.9 LBS | HEART RATE: 76 BPM

## 2022-12-15 DIAGNOSIS — F17.201 TOBACCO ABUSE, IN REMISSION: ICD-10-CM

## 2022-12-15 DIAGNOSIS — J96.11 HYPOXEMIC RESPIRATORY FAILURE, CHRONIC: ICD-10-CM

## 2022-12-15 DIAGNOSIS — J44.9 CHRONIC OBSTRUCTIVE PULMONARY DISEASE, UNSPECIFIED COPD TYPE: Primary | ICD-10-CM

## 2022-12-15 DIAGNOSIS — R06.00 DYSPNEA, UNSPECIFIED TYPE: ICD-10-CM

## 2022-12-15 DIAGNOSIS — G47.33 OSA (OBSTRUCTIVE SLEEP APNEA): ICD-10-CM

## 2022-12-15 PROCEDURE — 99213 OFFICE O/P EST LOW 20 MIN: CPT | Performed by: NURSE PRACTITIONER

## 2022-12-15 RX ORDER — IBUPROFEN 800 MG/1
800 TABLET ORAL 3 TIMES DAILY PRN
COMMUNITY
Start: 2022-10-17

## 2022-12-15 RX ORDER — FLUTICASONE FUROATE, UMECLIDINIUM BROMIDE AND VILANTEROL TRIFENATATE 200; 62.5; 25 UG/1; UG/1; UG/1
200 POWDER RESPIRATORY (INHALATION) DAILY
Qty: 2 EACH | Refills: 0 | COMMUNITY
Start: 2022-12-15 | End: 2022-12-16

## 2022-12-15 RX ORDER — FLUTICASONE FUROATE, UMECLIDINIUM BROMIDE AND VILANTEROL TRIFENATATE 200; 62.5; 25 UG/1; UG/1; UG/1
1 POWDER RESPIRATORY (INHALATION) DAILY
COMMUNITY
Start: 2022-12-05 | End: 2022-12-15 | Stop reason: SDUPTHER

## 2022-12-15 RX ORDER — CEFDINIR 300 MG/1
300 CAPSULE ORAL 2 TIMES DAILY
COMMUNITY
Start: 2022-12-08

## 2022-12-15 RX ORDER — FLUTICASONE FUROATE, UMECLIDINIUM BROMIDE AND VILANTEROL TRIFENATATE 200; 62.5; 25 UG/1; UG/1; UG/1
1 POWDER RESPIRATORY (INHALATION) DAILY
Qty: 1 EACH | Refills: 11 | Status: SHIPPED | OUTPATIENT
Start: 2022-12-15

## 2022-12-15 RX ORDER — ALBUTEROL SULFATE 90 UG/1
2 AEROSOL, METERED RESPIRATORY (INHALATION) EVERY 6 HOURS PRN
Qty: 18 G | Refills: 4 | Status: SHIPPED | OUTPATIENT
Start: 2022-12-15

## 2022-12-15 NOTE — PROGRESS NOTES
Primary Care Provider  Rashida Piper APRN   Referring Provider  No ref. provider found    Patient Complaint  Follow-up, COPD, Sleep Apnea, Shortness of Breath, and Bronchitis      SUBJECTIVE    History of Presenting Illness  Isis Mars is a pleasant 62 y.o. female who presents to [facility] for follow up appointment. Patient last saw Esthela Johnson NP 2022. Patient has diagnoses of COPD, NE on CPAP. Her DME is [facility]. She continues with 02 bleed into CPAP. She continues on Xyzal, singulair, daliresp, trelegy and Ventolin inhaler.  Patient was recently diagnosed with pneumonia by her PCP and treated with prednisone which she has completed and Omnicef of which she has a few days left.  Patient states her breathing is back at baseline.  She continues to have some dyspnea with exertion of moderate severity but improves with rest she states  coughing, wheezing, headaches, chest pain, weight loss or hemoptysis. Denies fevers, chills and night sweats. Isis Mars is able to perform ADLs without difficulties and denies any swollen glands/lymph nodes in the head or neck.    I have personally reviewed the review of systems, past family, social, medical and surgical histories; and agree with their findings.    Review of Systems   Constitutional: Negative.    HENT: Negative.    Eyes: Negative.    Respiratory: Positive for shortness of breath.    Cardiovascular: Negative.    Musculoskeletal: Negative.    Skin: Negative.         Family History   Problem Relation Age of Onset   • Diabetes Brother         Social History     Socioeconomic History   • Marital status: Single   Tobacco Use   • Smoking status: Former     Packs/day: 1.00     Years: 40.00     Pack years: 40.00     Types: Cigarettes     Quit date:      Years since quittin.9   • Smokeless tobacco: Never   • Tobacco comments:     25-50 PK YEARS   Vaping Use   • Vaping Use: Never used    Substance and Sexual Activity   • Alcohol use: Not Currently   • Drug use: Never        Past Medical History:   Diagnosis Date   • Arthritis    • Asthma    • Atypical lobular hyperplasia of right breast    • Chronic bronchitis (HCC)    • COPD (chronic obstructive pulmonary disease) ([facility])     INHALER   • Hypertension     MEDS CONTROL   • SOB (shortness of breath)         Immunization History   Administered Date(s) Administered   • COVID-19 (PFIZER) PURPLE CAP 03/22/2021, 04/12/2021, 12/02/2021   • Flu Vaccine Quad PF >36MO 11/22/2016   • FluLaval/Fluzone >6mos 09/20/2022   • Influenza, Unspecified 12/01/2018, 10/11/2021       Allergies   Allergen Reactions   • Azithromycin Shortness Of Breath and Swelling   • Doxycycline Rash   • Sulfamethoxazole-Trimethoprim Unknown - High Severity     Makes pt feel weird           Current Outpatient Medications:   •  albuterol sulfate HFA (Ventolin HFA) 108 (90 Base) MCG/ACT inhaler, Inhale 2 puffs Every 6 (Six) Hours As Needed for Wheezing., Disp: 18 g, Rfl: 4  •  alendronate (FOSAMAX) 70 MG tablet, TAKE 1 TABLET BY MOUTH ONCE WEEKLY BEFORE BREAKFAST, ON EMPTY STOMACH; REMAIN UPRIGHT FOR 30 MINUTES; TAKE WITH 8OZ OF WATER, Disp: , Rfl:   •  azelastine (ASTELIN) 0.1 % nasal spray, SPRAY ONE SPRAY IN EACH NOSTRIL EVERY DAY, Disp: , Rfl:   •  Calcium Carbonate 1500 (600 Ca) MG tablet, Take 600 mg by mouth Daily., Disp: , Rfl:   •  cefdinir (OMNICEF) 300 MG capsule, Take 300 mg by mouth 2 (Two) Times a Day., Disp: , Rfl:   •  Daliresp 500 MCG tablet tablet, Take 1 tablet by mouth Daily for 90 days., Disp: 90 tablet, Rfl: 3  •  EPINEPHrine (EPIPEN) 0.3 MG/0.3ML solution auto-injector injection, See Admin Instructions. Inject into the middle of the outer thigh and hold for 3 seconds as needed for severe allergic reaction then call 911 if used, Disp: , Rfl:   •  fluticasone (FLONASE) 50 MCG/ACT nasal spray, 2 sprays by Each Nare route Daily., Disp: 11.1 mL, Rfl: 5  •  furosemide  "(LASIX) 20 MG tablet, Take 20 mg by mouth Daily., Disp: , Rfl:   •  ibuprofen (ADVIL,MOTRIN) 800 MG tablet, Take 800 mg by mouth 3 (Three) Times a Day As Needed. for pain, Disp: , Rfl:   •  levocetirizine (XYZAL) 5 MG tablet, , Disp: , Rfl:   •  meclizine (ANTIVERT) 12.5 MG tablet, TAKE ONE TABLET BY MOUTH EVERY 8 HOURS AS NEEDED FOR FOR DIZZINESS - HOLD OTHER FOR ALLERGY MEDS WHEN IN USE, Disp: , Rfl:   •  omeprazole (priLOSEC) 40 MG capsule, Take 40 mg by mouth Daily., Disp: , Rfl:   •  Trelegy Ellipta 200-62.5-25 MCG/ACT aerosol powder , Inhale 1 puff Daily., Disp: 1 each, Rfl: 11  •  vitamin D3 125 MCG (5000 UT) capsule capsule, Take 1 capsule by mouth Daily., Disp: , Rfl:   •  cyclobenzaprine (FLEXERIL) 10 MG tablet, take ONE-HALF TO ONE tablet BY MOUTH THREE TIMES DAILY AS NEEDED, Disp: , Rfl:   •  Fluticasone-Umeclidin-Vilant (Trelegy Ellipta) 200-62.5-25 MCG/ACT aerosol powder , Inhale 200 mg Daily for 1 day., Disp: 2 each, Rfl: 0  •  meloxicam (MOBIC) 15 MG tablet, meloxicam 15 mg oral tablet take 1 tablet (15 mg) by oral route once daily   Active, Disp: , Rfl:   •  montelukast (SINGULAIR) 10 MG tablet, Take 1 tablet by mouth Every Night for 90 days., Disp: 90 tablet, Rfl: 4       OBJECTIVE    Vital Signs   /71 (BP Location: Left arm, Patient Position: Sitting, Cuff Size: Adult)   Pulse 76   Temp 98.4 °F (36.9 °C) (Tympanic)   Resp 18   Ht 165.1 cm (65\")   Wt 93.8 kg (206 lb 14.4 oz)   SpO2 92% Comment: room air  BMI 34.43 kg/m²     Physical Exam  Vitals reviewed.   Constitutional:       General: She is not in acute distress.     Appearance: Normal appearance. She is obese. She is not ill-appearing.   HENT:      Head: Normocephalic and atraumatic.      Nose: Nose normal.      Mouth/Throat:      Mouth: Mucous membranes are moist.      Pharynx: Oropharynx is clear.   Eyes:      Extraocular Movements: Extraocular movements intact.      Conjunctiva/sclera: Conjunctivae normal.      Pupils: Pupils " are equal, round, and reactive to light.   Cardiovascular:      Rate and Rhythm: Normal rate and regular rhythm.      Pulses: Normal pulses.      Heart sounds: Normal heart sounds.   Pulmonary:      Effort: Pulmonary effort is normal.      Breath sounds: Normal breath sounds.   Abdominal:      General: Bowel sounds are normal.   Musculoskeletal:         General: Normal range of motion.      Cervical back: Normal range of motion.   Skin:     General: Skin is warm.   Neurological:      General: No focal deficit present.      Mental Status: She is alert and oriented to person, place, and time.   Psychiatric:         Mood and Affect: Mood normal.         Behavior: Behavior normal.          Results Review  I have personally reviewed the prior office notes diagnostics.  CT Chest Low Dose Follow Up Without Contrast [[id number]] (Order [id number])  Order  Status: Final result     Appointment Information    Display Notes    V-AL                   PACS Images     Radiology Images    Study Result    Narrative & Impression   PROCEDURE:  CT CHEST LOW DOSE FOLLOW UP WITHOUT CONTRAST     COMPARISON: [facility], CT, CT CHEST LOW DOSE CANCER SCREENING WO, 5/16/2022,   13:49.  5/16/2022 and 8/14/2020 and 3/6/2019.  This is the 4th screening exam.     PROTOCOL:     Standard imaging protocol performed                 RADIATION:      DLP: 132.4mGy*cm               Automated exposure control was utilized to minimize radiation dose.      DIAGNOSTIC QUALITY:             Satisfactory     LUNG-RADS CLASSIFICATION:            Lung rads category 2:  Benign appearance or behavior.     FINDINGS:          There is a 4 mm nodule in the right upper lobe on axial image 41. There is mild emphysema.  There   is a small fat containing right-sided Bochdalek's hernia.  There is minor scarring in the left lung   base.  Previously seen nodular density in the medial left lower lobe is obscured by a band of   scarring.  There is a semi  solid nodular density in the periphery of the left upper lobe along the   fissure on axial image 91 measuring 5 mm.  There is a small focus of airspace disease in the right   lower lobe centrally on image 79, new from the prior study.  There is a new 3 mm left lower lobe   nodule anteriorly on image 93. There is a new 5 mm left upper lobe nodule on axial image 54. No   pneumothorax or pleural effusion.  Airways are patent.       Subcutaneous fat and underlying musculature appear within normal limits.  No acute findings in the   limited images of the upper abdomen.  The thyroid, trachea and esophagus appear within normal   limits.  There is no mediastinal lymphadenopathy or pericardial effusion.  Heart size is normal.    There are no acute osseous abnormalities or destructive bone lesions.  There is mild   dextroscoliosis of the thoracic spine and there are mild thoracic degenerative changes.  No   significant coronary artery calcifications.     IMPRESSION:                 1. Multiple small lung nodules, some of which are new including a 4 mm right upper lobe nodule and   a 5 mm left upper lobe nodule.  Recommend six-month follow-up chest CT.  2. Mild emphysema.  3. Minor scarring in the lungs.               RAZ CARVAJAL MD         Electronically Signed and Approved By: RAZ CARVAJAL MD on 11/14/2022 at 15:16                    ASSESSMENT & PLAN    Patient Active Problem List   Diagnosis   • Hearing loss of right ear   • Chronic obstructive pulmonary disease (HCC)   • Hypoxemic respiratory failure, chronic (HCC)   • NE (obstructive sleep apnea)   • Tobacco abuse, in remission   • COVID-19 virus detected   • Dyspnea   • Encounter for immunization   • Gastroesophageal reflux disease       Diagnoses and all orders for this visit:    1. Chronic obstructive pulmonary disease, unspecified COPD type (HCC) (Primary)  -     albuterol sulfate HFA (Ventolin HFA) 108 (90 Base) MCG/ACT inhaler; Inhale 2 puffs Every 6 (Six)  Hours As Needed for Wheezing.  Dispense: 18 g; Refill: 4  -     Trelegy Ellipta 200-62.5-25 MCG/ACT aerosol powder ; Inhale 1 puff Daily.  Dispense: 1 each; Refill: 11    2. Dyspnea, unspecified type  -     albuterol sulfate HFA (Ventolin HFA) 108 (90 Base) MCG/ACT inhaler; Inhale 2 puffs Every 6 (Six) Hours As Needed for Wheezing.  Dispense: 18 g; Refill: 4  -     Trelegy Ellipta 200-62.5-25 MCG/ACT aerosol powder ; Inhale 1 puff Daily.  Dispense: 1 each; Refill: 11    3. NE (obstructive sleep apnea)  -     albuterol sulfate HFA (Ventolin HFA) 108 (90 Base) MCG/ACT inhaler; Inhale 2 puffs Every 6 (Six) Hours As Needed for Wheezing.  Dispense: 18 g; Refill: 4  -     Trelegy Ellipta 200-62.5-25 MCG/ACT aerosol powder ; Inhale 1 puff Daily.  Dispense: 1 each; Refill: 11    4. Hypoxemic respiratory failure, chronic (HCC)  -     albuterol sulfate HFA (Ventolin HFA) 108 (90 Base) MCG/ACT inhaler; Inhale 2 puffs Every 6 (Six) Hours As Needed for Wheezing.  Dispense: 18 g; Refill: 4  -     Trelegy Ellipta 200-62.5-25 MCG/ACT aerosol powder ; Inhale 1 puff Daily.  Dispense: 1 each; Refill: 11    5. Tobacco abuse, in remission  -     albuterol sulfate HFA (Ventolin HFA) 108 (90 Base) MCG/ACT inhaler; Inhale 2 puffs Every 6 (Six) Hours As Needed for Wheezing.  Dispense: 18 g; Refill: 4  -     Trelegy Ellipta 200-62.5-25 MCG/ACT aerosol powder ; Inhale 1 puff Daily.  Dispense: 1 each; Refill: 11           Plan:  Patient is scheduled for a follow-up CT for lung nodules in May 2023.  Samples of Trelegy provided patient in the office today with instruction to rinse her mouth out after each use.  Refills today.  Patient will follow-up in 6 months after her CT in May or sooner if needed.    Smoking status: Former  Vaccination status: Up-to-date with COVID and flu  Medications personally reviewed    Follow Up  Return in about 6 months (around 6/15/2023).    Patient was given instructions and counseling regarding her condition or  for health maintenance advice. Please see specific information pulled into the AVS if appropriate.

## 2023-02-23 ENCOUNTER — TELEPHONE (OUTPATIENT)
Dept: PULMONOLOGY | Facility: CLINIC | Age: 63
End: 2023-02-23
Payer: MEDICARE

## 2023-02-23 NOTE — TELEPHONE ENCOUNTER
Patient called and is wanting to know if it would be okay if she gets the next COVID vaccine, please advise. Thank you.

## 2023-05-08 ENCOUNTER — HOSPITAL ENCOUNTER (OUTPATIENT)
Dept: CT IMAGING | Facility: HOSPITAL | Age: 63
Discharge: HOME | End: 2023-05-08
Admitting: NURSE PRACTITIONER
Payer: MEDICARE

## 2023-05-08 DIAGNOSIS — R91.1 PULMONARY NODULE: ICD-10-CM

## 2023-05-08 DIAGNOSIS — K76.9 LIVER LESION: ICD-10-CM

## 2023-05-08 DIAGNOSIS — R91.8 MULTIPLE PULMONARY NODULES: Primary | ICD-10-CM

## 2023-05-08 PROCEDURE — 71250 CT THORAX DX C-: CPT

## 2023-05-09 ENCOUNTER — TELEPHONE (OUTPATIENT)
Dept: PULMONOLOGY | Facility: CLINIC | Age: 63
End: 2023-05-09
Payer: MEDICARE

## 2023-05-10 ENCOUNTER — TELEPHONE (OUTPATIENT)
Dept: PULMONOLOGY | Facility: CLINIC | Age: 63
End: 2023-05-10
Payer: MEDICARE

## 2023-05-10 NOTE — TELEPHONE ENCOUNTER
Patient's gastro referral was sent back to me with the following communication.    the current availability with our office is (09/06/23), routing back to ambulatory for review to confirm referring provider is okay with this availability      Please advise.

## 2023-05-11 NOTE — TELEPHONE ENCOUNTER
Patient is fine with waiting until Gastro in [place] has an available appointment. I sent the referral back to Gastro with this information.

## 2023-06-02 ENCOUNTER — HOSPITAL ENCOUNTER (OUTPATIENT)
Dept: CT IMAGING | Facility: HOSPITAL | Age: 63
Discharge: HOME | End: 2023-06-02
Admitting: NURSE PRACTITIONER

## 2023-06-02 DIAGNOSIS — K76.9 LIVER LESION: ICD-10-CM

## 2023-06-02 LAB
CREAT BLDA-MCNC: 0.8 MG/DL
EGFRCR SERPLBLD CKD-EPI 2021: 82.9 ML/MIN/1.73

## 2023-06-02 PROCEDURE — 74178 CT ABD&PLV WO CNTR FLWD CNTR: CPT

## 2023-06-02 PROCEDURE — 82565 ASSAY OF CREATININE: CPT

## 2023-06-02 PROCEDURE — 25510000001 IOPAMIDOL PER 1 ML: Performed by: NURSE PRACTITIONER

## 2023-06-02 RX ADMIN — IOPAMIDOL 100 ML: 755 INJECTION, SOLUTION INTRAVENOUS at 11:13

## 2023-06-12 NOTE — PROGRESS NOTES
Primary Care Provider  Rashida Piper APRN   Referring Provider  No ref. provider found    Patient Complaint  COPD, Follow-up (6 Month ), Cough (Productive ), and Abdominal Pain      SUBJECTIVE    History of Presenting Illness  Isis Mars is a pleasant 62 y.o. female who presents to [facility] for 6-month follow-up.  I last saw the patient 12/15/2022. Patient has diagnoses of COPD, NE on CPAP. Her DME is [employer]. She continues with 02 bleed into CPAP. She continues on Xyzal, singulair, daliresp, trelegy and Ventolin inhaler.  Patient did have her CT done 5/8/2023 which showed increase in the size of the left upper lobe nodule to 7 mm (previously 5 mm). Also stable 4 mm nodule in the right upper lobe. Recommendation by radiologist to repeat ct in 3 months, order placed. In addition there are indeterminate liver lesions and recommendation for liver imaging. CT abdomen does show a 1.5 cm lesion in the left liver lobe.  Patient was referred to gastroenterology for further evaluation.    Overall patient is doing very well with her breathing.  She does have shortness of air with exertional activities but improves with rest. Presently patient is having some upper abdominal discomfort over the past few days.  She states she is taking Prilosec daily does not feel that it is related to reflux.  She recently had her CT of her abdomen and pelvis due to liver lesions.  This was reviewed with patient today.  She does have a small hiatal hernia.  She also has some descending diverticula.    Denies coughing, wheezing, headaches, chest pain, weight loss or hemoptysis. Denies fevers, chills and night sweats. Isis Mars is able to perform ADLs without difficulties and denies any swollen glands/lymph nodes in the head or neck.    I have personally reviewed the review of systems, past family, social, medical and surgical histories; and agree with their  findings.    Review of Systems  Constitutional symptoms:  Denied complaints   Ear, nose, throat: Denied complaints  Cardiovascular:  Denied complaints  Respiratory: Shortness of air with exertional activities   Gastrointestinal: Epigastric discomfort  Musculoskeletal: Denied complaints    Family History   Problem Relation Age of Onset    Emphysema Father     Diabetes Brother         Social History     Socioeconomic History    Marital status: Single   Tobacco Use    Smoking status: Former     Packs/day: 1.00     Years: 30.00     Pack years: 30.00     Types: Cigarettes     Start date: 1975     Quit date: 2014     Years since quittin.8     Passive exposure: Past    Smokeless tobacco: Never    Tobacco comments:     25-50 PK YEARS   Vaping Use    Vaping Use: Never used   Substance and Sexual Activity    Alcohol use: Not Currently    Drug use: Never    Sexual activity: Not Currently     Partners: Male        Past Medical History:   Diagnosis Date    Arthritis     Asthma     Asthma, intrinsic     Atypical lobular hyperplasia of right breast     Bronchiectasis     Chronic bronchitis     COPD (chronic obstructive pulmonary disease)     INHALER    Emphysema of lung 23    GERD (gastroesophageal reflux disease)     Hypertension     MEDS CONTROL    Lung nodule     Primary central sleep apnea     Sleep apnea     SOB (shortness of breath)         Immunization History   Administered Date(s) Administered    COVID-19 (PFIZER) BIVALENT 12+YRS 2023    COVID-19 (PFIZER) Purple Cap Monovalent 2021, 2021, 2021    Flu Vaccine Quad PF >36MO 2016    FluLaval/Fluzone >6mos 2016, 2022    Influenza Injectable Mdck Pf Quad 10/11/2021    Influenza, Unspecified 2018, 10/11/2021       Allergies   Allergen Reactions    Azithromycin Shortness Of Breath and Swelling    Doxycycline Rash    Sulfamethoxazole-Trimethoprim Unknown - High Severity     Makes pt feel weird           Current  Outpatient Medications:     albuterol sulfate HFA (Ventolin HFA) 108 (90 Base) MCG/ACT inhaler, Inhale 2 puffs Every 6 (Six) Hours As Needed for Wheezing., Disp: 18 g, Rfl: 4    alendronate (FOSAMAX) 70 MG tablet, TAKE 1 TABLET BY MOUTH ONCE WEEKLY BEFORE BREAKFAST, ON EMPTY STOMACH; REMAIN UPRIGHT FOR 30 MINUTES; TAKE WITH 8OZ OF WATER, Disp: , Rfl:     azelastine (ASTELIN) 0.1 % nasal spray, SPRAY ONE SPRAY IN EACH NOSTRIL EVERY DAY, Disp: , Rfl:     Calcium Carbonate 1500 (600 Ca) MG tablet, Take 1 tablet by mouth Daily., Disp: , Rfl:     cyclobenzaprine (FLEXERIL) 10 MG tablet, take ONE-HALF TO ONE tablet BY MOUTH THREE TIMES DAILY AS NEEDED, Disp: , Rfl:     Daliresp 500 MCG tablet tablet, Take 1 tablet by mouth Daily for 90 days., Disp: 90 tablet, Rfl: 3    EPINEPHrine (EPIPEN) 0.3 MG/0.3ML solution auto-injector injection, See Admin Instructions. Inject into the middle of the outer thigh and hold for 3 seconds as needed for severe allergic reaction then call 911 if used, Disp: , Rfl:     fluticasone (FLONASE) 50 MCG/ACT nasal spray, 2 sprays by Each Nare route Daily., Disp: 11.1 mL, Rfl: 5    furosemide (LASIX) 20 MG tablet, Take 1 tablet by mouth Daily., Disp: , Rfl:     ibuprofen (ADVIL,MOTRIN) 800 MG tablet, Take 1 tablet by mouth 3 (Three) Times a Day As Needed. for pain, Disp: , Rfl:     levocetirizine (XYZAL) 5 MG tablet, Take 1 tablet by mouth Every Morning., Disp: 90 tablet, Rfl: 3    meclizine (ANTIVERT) 12.5 MG tablet, TAKE ONE TABLET BY MOUTH EVERY 8 HOURS AS NEEDED FOR FOR DIZZINESS - HOLD OTHER FOR ALLERGY MEDS WHEN IN USE, Disp: , Rfl:     meloxicam (MOBIC) 15 MG tablet, meloxicam 15 mg oral tablet take 1 tablet (15 mg) by oral route once daily   Active, Disp: , Rfl:     montelukast (SINGULAIR) 10 MG tablet, Take 1 tablet by mouth Every Night for 360 days., Disp: 90 tablet, Rfl: 3    omeprazole (priLOSEC) 40 MG capsule, Take 1 capsule by mouth Daily., Disp: , Rfl:     Alisa Hu 200-62.5-25  "MCG/ACT aerosol powder , Inhale 1 puff Daily., Disp: 1 each, Rfl: 11    vitamin D3 125 MCG (5000 UT) capsule capsule, Take 1 capsule by mouth Daily., Disp: , Rfl:            Vital Signs   /64 (BP Location: Left arm, Patient Position: Sitting, Cuff Size: Large Adult)   Pulse 84   Resp 16   Ht 165.1 cm (65\")   Wt 92.1 kg (203 lb)   SpO2 91% Comment: Room air. Has O2 connected to CPAP  BMI 33.78 kg/m²       OBJECTIVE    Physical Exam  Vital Signs Reviewed   WDWN, Alert, NAD.    HEENT:  PERRL, EOMI.  OP, nares clear  Neck:  Supple, no JVD, no thyromegaly  Chest:  good aeration, clear to auscultation bilaterally, tympanic to percussion bilaterally, no work of breathing noted  CV: RRR, no MGR, pulses 2+, equal.  Abd:  Soft, NT, ND, + BS, no HSM  EXT:  no clubbing, no cyanosis, no edema  Neuro:  A&Ox3, CN grossly intact, no focal deficits.  Skin: No rashes or lesions noted    Results Review  I have personally reviewed the prior office notes, hospital records, labs, and diagnostics.  CT Chest Without Contrast Diagnostic [[id number]] (Order [id number])  Order  Status: Final result     Appointment Information    PACS Images     Radiology Images  Study Result    Narrative & Impression   PROCEDURE:  CT CHEST WO CONTRAST DIAGNOSTIC     COMPARISON: [facility], CT, CT LOW DOSE CHEST SCREENING, 3/06/2019, 14:05.    [facility], CT, CT LOW DOSE CHEST SCREENING, 8/14/2020, 11:38.  [facility], CT, CT CHEST LOW DOSE CANCER SCREENING WO, 5/16/2022, 13:49.  [facility], CT, CT CHEST LOW DOSE FOLLOW UP WITHOUT CONTRAST, 11/14/2022, 13:44.     INDICATIONS:  F/U LUNG NODULE, NO COMPLAINTS     TECHNIQUE:    CT images were created without the administration of contrast material.       PROTOCOL:     Standard imaging protocol performed                 RADIATION:      DLP: 410.5mGy*cm               Automated exposure control was utilized to minimize " radiation dose.      FINDINGS:          Lungs/pleura:  Previously demonstrated 5 mm left upper lobe nodule has increased in size, now   measuring 7 mm.  No significant change in 4 mm right upper lobe nodule.  Previously describe semi   solid nodule in the left upper lobe, airspace disease in the right lower lobe, and 3 mm nodule in   the left lower lobe have resolved.  No new nodule demonstrated.  Scarring present in the lingula   and lung bases.  No acute pulmonary abnormality.  No pleural effusion.  Fat containing [place]   hernia on the right     Shikha/mediastinum:  No adenopathy.  Thoracic aorta normal in caliber.  No coronary calcification.    No pericardial effusion     Possible low-attenuation lesions in the left hepatic lobe, not clearly present over the comparison   interval      Bones/soft tissues:  No suspicious bone lesion     IMPRESSION:                    1. Interval increase in size of left upper lobe nodule, now 7 mm.  This may be a growing infectious   or malignant lesion.  This is likely too small to confidently characterize with metabolic imaging.    Recommend follow-up chest CT in 3 months     2. Stable 4 mm nodule in the right upper lobe.  Additional opacities that were new on the prior   study have resolved     3. Indeterminate liver lesions.  Recommend dedicated liver MRI or CT              VELMA STRONG MD         Electronically Signed and Approved By: VELMA STRONG MD on 5/08/2023 at 15:45          CT Abd With & Without Contrast Pelvis With Contrast [[id number]] (Order [id number])  Order  Status: Final result     Appointment Information    PACS Images     Radiology Images  Study Result    Narrative & Impression   PROCEDURE:  CT ABD W WO CONTRAST PELVIS W CONTRAST     COMPARISON:  [facility], CT, CT CHEST WO CONTRAST DIAGNOSTIC, 5/08/2023,   12:40.  INDICATIONS:  LIVER LESIONS SEEN ON CT CHEST, C/O MID ABD PAIN X 2 WEEKS, WORSE AFTER EATING     TECHNIQUE:    After  obtaining the patient's consent, CT images of the abdomen were created without and   with non-ionic intravenous contrast material, and CT images of the pelvis were obtained with   non-ionic intravenous contrast material.       PROTOCOL:     Standard imaging protocol performed                 RADIATION:      DLP: 2032.1mGy*cm               Automated exposure control was utilized to minimize radiation dose.   CONTRAST:      100cc Isovue 370 I.V.  LABS:   eGFR: >60ml/min/1.73m2     FINDINGS:          Lower chest:  Emphysema in the lung bases.  Scarring in the left lung base.  Small hiatal hernia     Organs:  There is a lesion in the lateral segment of the left lobe of the liver that is   hypoattenuating to liver parenchyma pre contrast.  The lesion demonstrates homogeneous enhancement   in the arterial phase similar to blood pool, measuring 1.5 cm.  There is relative increased   attenuation of the liver parenchyma peripheral to the lesion in the arterial phase.  In the portal   venous phase, the lesion continues to enhance similar to blood pool.  On delayed imaging, the   lesion maintains subtle enhancement similar to blood pool.  In the medial segment of the left   hepatic lobe, adjacent to the charly, there is a focus of hypoattenuation relative to liver   parenchyma pre contrast.  This area remains hypoattenuating on postcontrast imaging.  Spleen,   pancreas, kidneys, adrenal glands unremarkable     GI tract:  Small hiatal hernia.  Diverticula of the descending and sigmoid colon.  No other   abnormality demonstrated.  No mesenteric adenopathy      Pelvis:  No mass or adenopathy.  Uterus surgically absent.  Urinary bladder unremarkable     Peritoneum/retroperitoneum:  No retroperitoneal adenopathy.  No ascites.  Normal caliber aorta     Bones/soft tissues:  No suspicious bone lesion.  Degenerative disc disease at L5-S1     IMPRESSION:                    1. There is a 1.5 cm lesion in the lateral segment of the left  hepatic lobe.  Although the   enhancement characteristics are not diagnostic, the findings are suggestive of a flash filling   hemangioma.  In the absence of any known malignancy that would otherwise suggest hypervascular   metastasis, a six-month follow-up CT scan is recommended     2. Focal fatty infiltration in the medial segment of the left hepatic lobe adjacent to the charly     3. Colonic diverticulosis             VELMA STRONG MD         Electronically Signed and Approved By: VELMA STRONG MD on 6/05/2023 at 8:17         ASSESSMENT         Patient Active Problem List   Diagnosis    Hearing loss of right ear    Chronic obstructive pulmonary disease    Hypoxemic respiratory failure, chronic    NE (obstructive sleep apnea)    Tobacco abuse, in remission    COVID-19 virus detected    Dyspnea    Encounter for immunization    Gastroesophageal reflux disease       Encounter Diagnoses   Name Primary?    Chronic obstructive pulmonary disease, unspecified COPD type Yes    Dyspnea, unspecified type     NE (obstructive sleep apnea)     Multiple pulmonary nodules     Tobacco abuse, in remission     Hypoxemic respiratory failure, chronic     Liver lesion     Upper abdominal pain       PLAN  At this time we will make referral to Dr. Ragsdale and an urgent request due to patient having upper abdominal symptoms.  Patient to continue with Trelegy 200 Daliresp Flonase Singulair Xyzal albuterol inhaler refill sent to her pharmacy today.  Patient is scheduled for a follow-up CT in August and she will follow-up in the office to get results when available.  Discussed with patient possible bronchoscopy pending those results.  Diagnoses and all orders for this visit:    1. Chronic obstructive pulmonary disease, unspecified COPD type (Primary)  -     albuterol sulfate HFA (Ventolin HFA) 108 (90 Base) MCG/ACT inhaler; Inhale 2 puffs Every 6 (Six) Hours As Needed for Wheezing.  Dispense: 18 g; Refill: 4  -     fluticasone (FLONASE)  50 MCG/ACT nasal spray; 2 sprays by Each Nare route Daily.  Dispense: 11.1 mL; Refill: 5  -     montelukast (SINGULAIR) 10 MG tablet; Take 1 tablet by mouth Every Night for 360 days.  Dispense: 90 tablet; Refill: 3    2. Dyspnea, unspecified type  -     albuterol sulfate HFA (Ventolin HFA) 108 (90 Base) MCG/ACT inhaler; Inhale 2 puffs Every 6 (Six) Hours As Needed for Wheezing.  Dispense: 18 g; Refill: 4    3. NE (obstructive sleep apnea)  -     albuterol sulfate HFA (Ventolin HFA) 108 (90 Base) MCG/ACT inhaler; Inhale 2 puffs Every 6 (Six) Hours As Needed for Wheezing.  Dispense: 18 g; Refill: 4    4. Multiple pulmonary nodules    5. Tobacco abuse, in remission  -     albuterol sulfate HFA (Ventolin HFA) 108 (90 Base) MCG/ACT inhaler; Inhale 2 puffs Every 6 (Six) Hours As Needed for Wheezing.  Dispense: 18 g; Refill: 4    6. Hypoxemic respiratory failure, chronic  -     albuterol sulfate HFA (Ventolin HFA) 108 (90 Base) MCG/ACT inhaler; Inhale 2 puffs Every 6 (Six) Hours As Needed for Wheezing.  Dispense: 18 g; Refill: 4  -     fluticasone (FLONASE) 50 MCG/ACT nasal spray; 2 sprays by Each Nare route Daily.  Dispense: 11.1 mL; Refill: 5  -     montelukast (SINGULAIR) 10 MG tablet; Take 1 tablet by mouth Every Night for 360 days.  Dispense: 90 tablet; Refill: 3    7. Liver lesion  -     Ambulatory Referral to Gastroenterology    8. Upper abdominal pain  -     Ambulatory Referral to Gastroenterology    Other orders  -     levocetirizine (XYZAL) 5 MG tablet; Take 1 tablet by mouth Every Morning.  Dispense: 90 tablet; Refill: 3           Smoking status:former  Vaccination status:up to date with COVID and flu  Medications personally reviewed    Follow Up  Return in about 2 months (around 8/15/2023) for after CT.    Patient was given instructions and counseling regarding her condition or for health maintenance advice. Please see specific information pulled into the AVS if appropriate.

## 2023-06-15 ENCOUNTER — OFFICE VISIT (OUTPATIENT)
Dept: PULMONOLOGY | Facility: CLINIC | Age: 63
End: 2023-06-15
Payer: MEDICARE

## 2023-06-15 VITALS
DIASTOLIC BLOOD PRESSURE: 64 MMHG | OXYGEN SATURATION: 91 % | WEIGHT: 203 LBS | HEIGHT: 65 IN | HEART RATE: 84 BPM | SYSTOLIC BLOOD PRESSURE: 124 MMHG | BODY MASS INDEX: 33.82 KG/M2 | RESPIRATION RATE: 16 BRPM

## 2023-06-15 DIAGNOSIS — F17.201 TOBACCO ABUSE, IN REMISSION: ICD-10-CM

## 2023-06-15 DIAGNOSIS — R06.00 DYSPNEA, UNSPECIFIED TYPE: ICD-10-CM

## 2023-06-15 DIAGNOSIS — R91.8 MULTIPLE PULMONARY NODULES: ICD-10-CM

## 2023-06-15 DIAGNOSIS — K76.9 LIVER LESION: ICD-10-CM

## 2023-06-15 DIAGNOSIS — G47.33 OSA (OBSTRUCTIVE SLEEP APNEA): ICD-10-CM

## 2023-06-15 DIAGNOSIS — R10.10 UPPER ABDOMINAL PAIN: ICD-10-CM

## 2023-06-15 DIAGNOSIS — J96.11 HYPOXEMIC RESPIRATORY FAILURE, CHRONIC: ICD-10-CM

## 2023-06-15 DIAGNOSIS — J44.9 CHRONIC OBSTRUCTIVE PULMONARY DISEASE, UNSPECIFIED COPD TYPE: Primary | ICD-10-CM

## 2023-06-15 RX ORDER — MONTELUKAST SODIUM 10 MG/1
10 TABLET ORAL NIGHTLY
Qty: 90 TABLET | Refills: 3 | Status: SHIPPED | OUTPATIENT
Start: 2023-06-15 | End: 2024-06-09

## 2023-06-15 RX ORDER — FLUTICASONE PROPIONATE 50 MCG
2 SPRAY, SUSPENSION (ML) NASAL DAILY
Qty: 11.1 ML | Refills: 5 | Status: SHIPPED | OUTPATIENT
Start: 2023-06-15

## 2023-06-15 RX ORDER — LEVOCETIRIZINE DIHYDROCHLORIDE 5 MG/1
5 TABLET, FILM COATED ORAL EVERY MORNING
Qty: 90 TABLET | Refills: 3 | Status: SHIPPED | OUTPATIENT
Start: 2023-06-15

## 2023-06-15 RX ORDER — ALBUTEROL SULFATE 90 UG/1
2 AEROSOL, METERED RESPIRATORY (INHALATION) EVERY 6 HOURS PRN
Qty: 18 G | Refills: 4 | Status: SHIPPED | OUTPATIENT
Start: 2023-06-15

## 2023-08-09 ENCOUNTER — HOSPITAL ENCOUNTER (OUTPATIENT)
Dept: CT IMAGING | Facility: HOSPITAL | Age: 63
Discharge: HOME | End: 2023-08-09
Admitting: NURSE PRACTITIONER
Payer: MEDICARE

## 2023-08-09 DIAGNOSIS — R91.8 MULTIPLE PULMONARY NODULES: ICD-10-CM

## 2023-08-09 PROCEDURE — 71250 CT THORAX DX C-: CPT

## 2023-08-10 ENCOUNTER — TELEPHONE (OUTPATIENT)
Dept: PULMONOLOGY | Facility: CLINIC | Age: 63
End: 2023-08-10

## 2023-08-10 DIAGNOSIS — R91.8 ABNORMAL CT SCAN OF LUNG: Primary | ICD-10-CM

## 2023-08-10 NOTE — TELEPHONE ENCOUNTER
Caller: Isis Mars    Relationship to patient: Self    Best call back number: [phone]    Patient is needing: TO LET OFFICE KNOW THE TRANSPORTATION COMPANY WILL BE FAXING OVER A FORM FOR [facility] TO SIGN FOR HER UPCOMING CT SCAN

## 2023-08-31 ENCOUNTER — HOSPITAL ENCOUNTER (OUTPATIENT)
Dept: PET IMAGING | Facility: HOSPITAL | Age: 63
Discharge: HOME | End: 2023-08-31
Payer: MEDICARE

## 2023-08-31 DIAGNOSIS — R91.8 ABNORMAL CT SCAN OF LUNG: ICD-10-CM

## 2023-08-31 PROCEDURE — A9552 F18 FDG: HCPCS | Performed by: NURSE PRACTITIONER

## 2023-08-31 PROCEDURE — 78815 PET IMAGE W/CT SKULL-THIGH: CPT

## 2023-08-31 PROCEDURE — 0 FLUDEOXYGLUCOSE F18 SOLUTION: Performed by: NURSE PRACTITIONER

## 2023-08-31 RX ADMIN — FLUDEOXYGLUCOSE F18 1 DOSE: 300 INJECTION INTRAVENOUS at 13:06

## 2023-09-06 ENCOUNTER — TELEPHONE (OUTPATIENT)
Dept: PULMONOLOGY | Facility: CLINIC | Age: 63
End: 2023-09-06

## 2023-09-06 ENCOUNTER — OFFICE VISIT (OUTPATIENT)
Dept: PULMONOLOGY | Facility: CLINIC | Age: 63
End: 2023-09-06
Payer: MEDICARE

## 2023-09-06 VITALS
RESPIRATION RATE: 16 BRPM | HEIGHT: 65 IN | DIASTOLIC BLOOD PRESSURE: 63 MMHG | SYSTOLIC BLOOD PRESSURE: 125 MMHG | WEIGHT: 203 LBS | TEMPERATURE: 97.7 F | BODY MASS INDEX: 33.82 KG/M2 | HEART RATE: 60 BPM | OXYGEN SATURATION: 94 %

## 2023-09-06 DIAGNOSIS — R91.1 LUNG NODULE: Primary | ICD-10-CM

## 2023-09-06 RX ORDER — ASPIRIN 81 MG/1
81 TABLET ORAL DAILY
COMMUNITY

## 2023-09-06 NOTE — PROGRESS NOTES
Primary Care Provider  Rashida Piper APRN   Referring Provider  No ref. provider found      Patient Complaint  Lung Nodule      Subjective          Isis Mars presents to [facility]      History of Presenting Illness  Isis Mars is a 63 y.o. female here as a new patient for lung nodule.    Patient had a chest CT done 8/9/2023 which showed a interval enlargement of a left upper lobe nodule now measuring 1.0 cm.  Follow-up PET scan showed hypermetabolic SUV 9.9 concerning for neoplasm.    Patient was a previous heavy tobacco user, smoking 1 pack/day for about 25 years.  She quit 15 years ago.  She denies B symptoms.  No hemoptysis, no weight loss, no lymphadenopathy, no night sweats.  She denies family history of lung cancer.  She used to work as a cleaning lady at a hospital.  She uses trilogy daily with as needed albuterol and feels like this regimen keeps her symptoms controlled.  She is currently on low-dose aspirin.  We discussed her CT and PET CT findings.  She expressed understanding.  I also informed her that she will need Ion navigational bronchoscopy for tissue biopsy of this lung nodule.  She expressed understanding. I have personally reviewed patient's past family, social, medical and surgical histories and agree with their findings.    Review of Systems  Constitutional:  No fever. No chills. No weakness.  Eyes: No pain, erythema, or discharge. No blurring of vision.  ENT:  No sore throat, URI symptoms.   Cardiovascular:  No chest pain. No palpitations. No lower extremity edema.  Respiratory:  No shortness of breath, cough, pleuritic chest pain. No hemoptysis. No dyspnea.   GI:  Normal appetite. No nausea, vomiting, diarrhea. No pain. No melena.  Musculoskeletal:  No arthralgias or myalgias.  Neurologic:  No headache. No weakness.    Family History   Problem Relation Age of Onset    Emphysema Father     Diabetes Brother         Social  History     Socioeconomic History    Marital status: Single   Tobacco Use    Smoking status: Former     Packs/day: 1.00     Years: 30.00     Pack years: 30.00     Types: Cigarettes     Start date: 1975     Quit date: 2014     Years since quittin.1     Passive exposure: Past    Smokeless tobacco: Never    Tobacco comments:     25-50 PK YEARS   Vaping Use    Vaping Use: Never used   Substance and Sexual Activity    Alcohol use: Not Currently    Drug use: Never    Sexual activity: Not Currently     Partners: Male        Past Medical History:   Diagnosis Date    Arthritis     Asthma     Asthma, intrinsic     Atypical lobular hyperplasia of right breast     Bronchiectasis     Chronic bronchitis     COPD (chronic obstructive pulmonary disease)     INHALER    Emphysema of lung 23    GERD (gastroesophageal reflux disease)     Hypertension     MEDS CONTROL    Lung nodule     Primary central sleep apnea     Sleep apnea     SOB (shortness of breath)         Immunization History   Administered Date(s) Administered    COVID-19 (PFIZER) BIVALENT 12+YRS 2023    COVID-19 (PFIZER) Purple Cap Monovalent 2021, 2021, 2021    Flu Vaccine Quad PF >36MO 2016    Fluzone >6mos 2016, 2022    Influenza Injectable Mdck Pf Quad 10/11/2021    Influenza, Unspecified 2018, 10/11/2021       Allergies   Allergen Reactions    Azithromycin Shortness Of Breath and Swelling    Doxycycline Rash    Sulfamethoxazole-Trimethoprim Unknown - High Severity     Makes pt feel weird           Current Outpatient Medications:     albuterol sulfate HFA (Ventolin HFA) 108 (90 Base) MCG/ACT inhaler, Inhale 2 puffs Every 6 (Six) Hours As Needed for Wheezing., Disp: 18 g, Rfl: 4    alendronate (FOSAMAX) 70 MG tablet, TAKE 1 TABLET BY MOUTH ONCE WEEKLY BEFORE BREAKFAST, ON EMPTY STOMACH; REMAIN UPRIGHT FOR 30 MINUTES; TAKE WITH 8OZ OF WATER, Disp: , Rfl:     aspirin 81 MG EC tablet, Take 1 tablet by mouth  "Daily., Disp: , Rfl:     azelastine (ASTELIN) 0.1 % nasal spray, SPRAY ONE SPRAY IN EACH NOSTRIL EVERY DAY, Disp: , Rfl:     Calcium Carbonate 1500 (600 Ca) MG tablet, Take 1 tablet by mouth Daily., Disp: , Rfl:     cyclobenzaprine (FLEXERIL) 10 MG tablet, take ONE-HALF TO ONE tablet BY MOUTH THREE TIMES DAILY AS NEEDED, Disp: , Rfl:     EPINEPHrine (EPIPEN) 0.3 MG/0.3ML solution auto-injector injection, See Admin Instructions. Inject into the middle of the outer thigh and hold for 3 seconds as needed for severe allergic reaction then call 911 if used, Disp: , Rfl:     fluticasone (FLONASE) 50 MCG/ACT nasal spray, 2 sprays by Each Nare route Daily., Disp: 11.1 mL, Rfl: 5    furosemide (LASIX) 20 MG tablet, Take 1 tablet by mouth Daily., Disp: , Rfl:     ibuprofen (ADVIL,MOTRIN) 800 MG tablet, Take 1 tablet by mouth 3 (Three) Times a Day As Needed. for pain, Disp: , Rfl:     levocetirizine (XYZAL) 5 MG tablet, Take 1 tablet by mouth Every Morning., Disp: 90 tablet, Rfl: 3    meclizine (ANTIVERT) 12.5 MG tablet, TAKE ONE TABLET BY MOUTH EVERY 8 HOURS AS NEEDED FOR FOR DIZZINESS - HOLD OTHER FOR ALLERGY MEDS WHEN IN USE, Disp: , Rfl:     meloxicam (MOBIC) 15 MG tablet, meloxicam 15 mg oral tablet take 1 tablet (15 mg) by oral route once daily   Active, Disp: , Rfl:     montelukast (SINGULAIR) 10 MG tablet, Take 1 tablet by mouth Every Night for 360 days., Disp: 90 tablet, Rfl: 3    omeprazole (priLOSEC) 40 MG capsule, Take 1 capsule by mouth Daily., Disp: , Rfl:     Trelegy Ellipta 200-62.5-25 MCG/ACT aerosol powder , Inhale 1 puff Daily., Disp: 1 each, Rfl: 11    vitamin D3 125 MCG (5000 UT) capsule capsule, Take 1 capsule by mouth Daily., Disp: , Rfl:     Daliresp 500 MCG tablet tablet, Take 1 tablet by mouth Daily for 90 days., Disp: 90 tablet, Rfl: 3     Objective     Vital Signs:   /63 (BP Location: Left arm, Patient Position: Sitting)   Pulse 60   Temp 97.7 °F (36.5 °C)   Resp 16   Ht 165.1 cm (65\")   " Wt 92.1 kg (203 lb)   SpO2 94% Comment: room air  BMI 33.78 kg/m²     Physical Exam  Vitals:    09/06/23 1247   BP: 125/63   Pulse: 60   Resp: 16   Temp: 97.7 °F (36.5 °C)   SpO2: 94%       General: Alert, NAD  HEENT:  EOMI, no sinus tenderness  Neck:  Supple, no thyromegaly,  no JVD  Lymph: no cervical, supraclavicular lymphadenopathy bilaterally  Chest:  clear to auscultation bilaterally, no wheezing or crackles; no work of breathing noted  CV: RRR, no M/G/R, pulses 2+, equal.  Abd:  Soft, NT, ND, +BS  EXT:  no clubbing, no cyanosis, no edema b/l  Neuro:  A&Ox3, CN grossly intact, no focal deficits  Skin: No rashes or lesions noted       Result Review :   I have personally reviewed patient's labs and images.          Assessment and Plan      Patient Active Problem List   Diagnosis    Hearing loss of right ear    Chronic obstructive pulmonary disease    Hypoxemic respiratory failure, chronic    NE (obstructive sleep apnea)    Tobacco abuse, in remission    COVID-19 virus detected    Dyspnea    Encounter for immunization    Gastroesophageal reflux disease       Impression and Plan:    Lung nodule, 1.0cm JOSE  Previous tobacco user; quit 15 yr ago; >25 pk yr smoking history    -Chest CT done 8/9/2023 which showed a interval enlargement of a left upper lobe nodule now measuring 1.0 cm.  Follow-up PET scan showed hypermetabolic SUV 9.9 concerning for neoplasm.  -Plan for Ion navigational bronchoscopy with EBUS bronchoscopy on 9/13.  We will reorder chest CT for Ion format for 9/13, prior to procedure.  -Follow-up 1 to 2 weeks after procedure  -Informed patient to hold her aspirin for 5 days prior to procedure as well as n.p.o. at midnight prior to procedure  -Continue Trelegy and as needed albuterol for now    Vaccination status: Patient is up-to-date with her vaccinations at this time    Medications personally reviewed.    Follow Up   No follow-ups on file.  Patient was given instructions and counseling regarding  her condition or for health maintenance advice. Please see specific information pulled into the AVS if appropriate.         no

## 2023-09-06 NOTE — H&P (VIEW-ONLY)
Primary Care Provider  Rashida Piper APRN   Referring Provider  No ref. provider found      Patient Complaint  Lung Nodule      Subjective          Isis Mars presents to [facility] PULMONARY & CRITICAL CARE MEDICINE      History of Presenting Illness  Isis Mars is a 63 y.o. female here as a new patient for lung nodule.    Patient had a chest CT done 8/9/2023 which showed a interval enlargement of a left upper lobe nodule now measuring 1.0 cm.  Follow-up PET scan showed hypermetabolic SUV 9.9 concerning for neoplasm.    Patient was a previous heavy tobacco user, smoking 1 pack/day for about 25 years.  She quit 15 years ago.  She denies B symptoms.  No hemoptysis, no weight loss, no lymphadenopathy, no night sweats.  She denies family history of lung cancer.  She used to work as a cleaning lady at a hospital.  She uses trilogy daily with as needed albuterol and feels like this regimen keeps her symptoms controlled.  She is currently on low-dose aspirin.  We discussed her CT and PET CT findings.  She expressed understanding.  I also informed her that she will need Ion navigational bronchoscopy for tissue biopsy of this lung nodule.  She expressed understanding. I have personally reviewed patient's past family, social, medical and surgical histories and agree with their findings.    Review of Systems  Constitutional:  No fever. No chills. No weakness.  Eyes: No pain, erythema, or discharge. No blurring of vision.  ENT:  No sore throat, URI symptoms.   Cardiovascular:  No chest pain. No palpitations. No lower extremity edema.  Respiratory:  No shortness of breath, cough, pleuritic chest pain. No hemoptysis. No dyspnea.   GI:  Normal appetite. No nausea, vomiting, diarrhea. No pain. No melena.  Musculoskeletal:  No arthralgias or myalgias.  Neurologic:  No headache. No weakness.    Family History   Problem Relation Age of Onset    Emphysema Father     Diabetes Brother         Social  History     Socioeconomic History    Marital status: Single   Tobacco Use    Smoking status: Former     Packs/day: 1.00     Years: 30.00     Pack years: 30.00     Types: Cigarettes     Start date: 1975     Quit date: 2014     Years since quittin.1     Passive exposure: Past    Smokeless tobacco: Never    Tobacco comments:     25-50 PK YEARS   Vaping Use    Vaping Use: Never used   Substance and Sexual Activity    Alcohol use: Not Currently    Drug use: Never    Sexual activity: Not Currently     Partners: Male        Past Medical History:   Diagnosis Date    Arthritis     Asthma     Asthma, intrinsic     Atypical lobular hyperplasia of right breast     Bronchiectasis     Chronic bronchitis     COPD (chronic obstructive pulmonary disease)     INHALER    Emphysema of lung 23    GERD (gastroesophageal reflux disease)     Hypertension     MEDS CONTROL    Lung nodule     Primary central sleep apnea     Sleep apnea     SOB (shortness of breath)         Immunization History   Administered Date(s) Administered    COVID-19 (PFIZER) BIVALENT 12+YRS 2023    COVID-19 (PFIZER) Purple Cap Monovalent 2021, 2021, 2021    Flu Vaccine Quad PF >36MO 2016    Fluzone >6mos 2016, 2022    Influenza Injectable Mdck Pf Quad 10/11/2021    Influenza, Unspecified 2018, 10/11/2021       Allergies   Allergen Reactions    Azithromycin Shortness Of Breath and Swelling    Doxycycline Rash    Sulfamethoxazole-Trimethoprim Unknown - High Severity     Makes pt feel weird           Current Outpatient Medications:     albuterol sulfate HFA (Ventolin HFA) 108 (90 Base) MCG/ACT inhaler, Inhale 2 puffs Every 6 (Six) Hours As Needed for Wheezing., Disp: 18 g, Rfl: 4    alendronate (FOSAMAX) 70 MG tablet, TAKE 1 TABLET BY MOUTH ONCE WEEKLY BEFORE BREAKFAST, ON EMPTY STOMACH; REMAIN UPRIGHT FOR 30 MINUTES; TAKE WITH 8OZ OF WATER, Disp: , Rfl:     aspirin 81 MG EC tablet, Take 1 tablet by mouth  "Daily., Disp: , Rfl:     azelastine (ASTELIN) 0.1 % nasal spray, SPRAY ONE SPRAY IN EACH NOSTRIL EVERY DAY, Disp: , Rfl:     Calcium Carbonate 1500 (600 Ca) MG tablet, Take 1 tablet by mouth Daily., Disp: , Rfl:     cyclobenzaprine (FLEXERIL) 10 MG tablet, take ONE-HALF TO ONE tablet BY MOUTH THREE TIMES DAILY AS NEEDED, Disp: , Rfl:     EPINEPHrine (EPIPEN) 0.3 MG/0.3ML solution auto-injector injection, See Admin Instructions. Inject into the middle of the outer thigh and hold for 3 seconds as needed for severe allergic reaction then call 911 if used, Disp: , Rfl:     fluticasone (FLONASE) 50 MCG/ACT nasal spray, 2 sprays by Each Nare route Daily., Disp: 11.1 mL, Rfl: 5    furosemide (LASIX) 20 MG tablet, Take 1 tablet by mouth Daily., Disp: , Rfl:     ibuprofen (ADVIL,MOTRIN) 800 MG tablet, Take 1 tablet by mouth 3 (Three) Times a Day As Needed. for pain, Disp: , Rfl:     levocetirizine (XYZAL) 5 MG tablet, Take 1 tablet by mouth Every Morning., Disp: 90 tablet, Rfl: 3    meclizine (ANTIVERT) 12.5 MG tablet, TAKE ONE TABLET BY MOUTH EVERY 8 HOURS AS NEEDED FOR FOR DIZZINESS - HOLD OTHER FOR ALLERGY MEDS WHEN IN USE, Disp: , Rfl:     meloxicam (MOBIC) 15 MG tablet, meloxicam 15 mg oral tablet take 1 tablet (15 mg) by oral route once daily   Active, Disp: , Rfl:     montelukast (SINGULAIR) 10 MG tablet, Take 1 tablet by mouth Every Night for 360 days., Disp: 90 tablet, Rfl: 3    omeprazole (priLOSEC) 40 MG capsule, Take 1 capsule by mouth Daily., Disp: , Rfl:     Trelegy Ellipta 200-62.5-25 MCG/ACT aerosol powder , Inhale 1 puff Daily., Disp: 1 each, Rfl: 11    vitamin D3 125 MCG (5000 UT) capsule capsule, Take 1 capsule by mouth Daily., Disp: , Rfl:     Daliresp 500 MCG tablet tablet, Take 1 tablet by mouth Daily for 90 days., Disp: 90 tablet, Rfl: 3     Objective     Vital Signs:   /63 (BP Location: Left arm, Patient Position: Sitting)   Pulse 60   Temp 97.7 °F (36.5 °C)   Resp 16   Ht 165.1 cm (65\")   " Wt 92.1 kg (203 lb)   SpO2 94% Comment: room air  BMI 33.78 kg/m²     Physical Exam  Vitals:    09/06/23 1247   BP: 125/63   Pulse: 60   Resp: 16   Temp: 97.7 °F (36.5 °C)   SpO2: 94%       General: Alert, NAD  HEENT:  EOMI, no sinus tenderness  Neck:  Supple, no thyromegaly,  no JVD  Lymph: no cervical, supraclavicular lymphadenopathy bilaterally  Chest:  clear to auscultation bilaterally, no wheezing or crackles; no work of breathing noted  CV: RRR, no M/G/R, pulses 2+, equal.  Abd:  Soft, NT, ND, +BS  EXT:  no clubbing, no cyanosis, no edema b/l  Neuro:  A&Ox3, CN grossly intact, no focal deficits  Skin: No rashes or lesions noted       Result Review :   I have personally reviewed patient's labs and images.          Assessment and Plan      Patient Active Problem List   Diagnosis    Hearing loss of right ear    Chronic obstructive pulmonary disease    Hypoxemic respiratory failure, chronic    NE (obstructive sleep apnea)    Tobacco abuse, in remission    COVID-19 virus detected    Dyspnea    Encounter for immunization    Gastroesophageal reflux disease       Impression and Plan:    Lung nodule, 1.0cm JOSE  Previous tobacco user; quit 15 yr ago; >25 pk yr smoking history    -Chest CT done 8/9/2023 which showed a interval enlargement of a left upper lobe nodule now measuring 1.0 cm.  Follow-up PET scan showed hypermetabolic SUV 9.9 concerning for neoplasm.  -Plan for Ion navigational bronchoscopy with EBUS bronchoscopy on 9/13.  We will reorder chest CT for Ion format for 9/13, prior to procedure.  -Follow-up 1 to 2 weeks after procedure  -Informed patient to hold her aspirin for 5 days prior to procedure as well as n.p.o. at midnight prior to procedure  -Continue Trelegy and as needed albuterol for now    Vaccination status: Patient is up-to-date with her vaccinations at this time    Medications personally reviewed.    Follow Up   No follow-ups on file.  Patient was given instructions and counseling regarding  her condition or for health maintenance advice. Please see specific information pulled into the AVS if appropriate.

## 2023-09-06 NOTE — TELEPHONE ENCOUNTER
Caller: Isis Mars    Relationship: Self    Best call back number: [phone]    What form or medical record are you requesting: [employer] REFERRAL    Who is requesting this form or medical record from you: [employer]    How would you like to receive the form or medical records (pick-up, mail, fax):    If fax, what is the fax number: WHATEVER FAX NUMBER IS ON THE FORM      Timeframe paperwork needed: ASAP    Additional notes: PATIENT HAS AN APPT FOR A CT AT THE [facility] 9/13/23 ([facility] PATIENT) SHE IS GIVING APPROVAL FOR THIS FORM TO BE FILLED OUT, AND FAXED BACK TO THE FAX NUMBER ON THE FORM THAT IS BEING FAXED TO THE OFFICE FROM THE [employer]. PLEASE FILL OUT ASAP AND GET BACK TO THEM SO THE PATIENT CAN MAKE SURE TO HAVE A RIDE FOR THIS TEST. ANY QUESTIONS, PLEASE CALL PATIENT. THANK YOU

## 2023-09-11 RX ORDER — HYDROXYZINE 50 MG/1
1 TABLET, FILM COATED ORAL DAILY
COMMUNITY
Start: 2023-08-10

## 2023-09-11 RX ORDER — BUSPIRONE HYDROCHLORIDE 15 MG/1
1 TABLET ORAL 3 TIMES DAILY
COMMUNITY
Start: 2023-08-10

## 2023-09-11 NOTE — PRE-PROCEDURE INSTRUCTIONS
Patient instructed to have no food past midnight, clears up to 2 hours prior to arrival time. Patient instructed to wear no lotions, jewelry or piercing's day of surgery.  Patient to take omprazole, trelegy , buspar, hydroxazine  and buspar am of surgery.

## 2023-09-13 ENCOUNTER — APPOINTMENT (OUTPATIENT)
Dept: GENERAL RADIOLOGY | Facility: HOSPITAL | Age: 63
End: 2023-09-13
Payer: MEDICARE

## 2023-09-13 ENCOUNTER — ANESTHESIA (OUTPATIENT)
Dept: PERIOP | Facility: HOSPITAL | Age: 63
End: 2023-09-13
Payer: MEDICARE

## 2023-09-13 ENCOUNTER — HOSPITAL ENCOUNTER (OUTPATIENT)
Dept: CT IMAGING | Facility: HOSPITAL | Age: 63
Discharge: HOME | End: 2023-09-13
Payer: MEDICARE

## 2023-09-13 ENCOUNTER — HOSPITAL ENCOUNTER (OUTPATIENT)
Facility: HOSPITAL | Age: 63
Setting detail: HOSPITAL OUTPATIENT SURGERY
Discharge: HOME | End: 2023-09-13
Attending: STUDENT IN AN ORGANIZED HEALTH CARE EDUCATION/TRAINING PROGRAM | Admitting: STUDENT IN AN ORGANIZED HEALTH CARE EDUCATION/TRAINING PROGRAM
Payer: MEDICARE

## 2023-09-13 ENCOUNTER — ANESTHESIA EVENT (OUTPATIENT)
Dept: PERIOP | Facility: HOSPITAL | Age: 63
End: 2023-09-13
Payer: MEDICARE

## 2023-09-13 VITALS
RESPIRATION RATE: 16 BRPM | OXYGEN SATURATION: 91 % | SYSTOLIC BLOOD PRESSURE: 104 MMHG | HEIGHT: 65 IN | HEART RATE: 71 BPM | DIASTOLIC BLOOD PRESSURE: 48 MMHG | WEIGHT: 206.79 LBS | TEMPERATURE: 96.7 F | BODY MASS INDEX: 34.45 KG/M2

## 2023-09-13 DIAGNOSIS — R91.1 LUNG NODULE: ICD-10-CM

## 2023-09-13 LAB
ACB CMPLX DNA BAL NAA+NON-PRB-NCNCRNG: NOT DETECTED
BLACTX-M ISLT/SPM QL: NORMAL
BLAIMP ISLT/SPM QL: NORMAL
BLAKPC ISLT/SPM QL: NORMAL
BLAOXA-48-LIKE ISLT/SPM QL: NORMAL
BLAVIM ISLT/SPM QL: NORMAL
C PNEUM DNA NPH QL NAA+NON-PROBE: NOT DETECTED
E CLOAC COMP DNA BAL NAA+NON-PRB-NCNCRNG: NOT DETECTED
E COLI DNA BAL NAA+NON-PRB-NCNCRNG: NOT DETECTED
EOSINOPHIL NFR FLD MANUAL: 1 %
EOSINOPHIL SPEC QL MICRO: 4 % EOS/100 CELLS (ref 0–0)
FLUAV SUBTYP SPEC NAA+PROBE: NOT DETECTED
FLUBV RNA ISLT QL NAA+PROBE: NOT DETECTED
GP B STREP DNA BAL NAA+NON-PRB-NCNCRNG: NOT DETECTED
GRAM STN SPEC: NORMAL
GRAM STN SPEC: NORMAL
HADV DNA SPEC NAA+PROBE: NOT DETECTED
HAEM INFLU DNA BAL NAA+NON-PRB-NCNCRNG: NOT DETECTED
HCOV RNA LOWER RESP QL NAA+NON-PROBE: NOT DETECTED
HMPV RNA NPH QL NAA+NON-PROBE: NOT DETECTED
HPIV RNA LOWER RESP QL NAA+NON-PROBE: NOT DETECTED
K AEROGENES DNA BAL NAA+NON-PRB-NCNCRNG: NOT DETECTED
K OXYTOCA DNA BAL NAA+NON-PRB-NCNCRNG: NOT DETECTED
K PNEU GRP DNA BAL NAA+NON-PRB-NCNCRNG: NOT DETECTED
L PNEUMO DNA LOWER RESP QL NAA+NON-PROBE: NOT DETECTED
LYMPHOCYTES NFR FLD MANUAL: 43 %
M CATARRHALIS DNA BAL NAA+NON-PRB-NCNCRNG: NOT DETECTED
M PNEUMO IGG SER IA-ACNC: NOT DETECTED
MACROPHAGE FLUID: 2 %
MECA+MECC ISLT/SPM QL: NORMAL
NDM GENE: NORMAL
NEUTROPHILS NFR FLD MANUAL: 54 %
NIGHT BLUE STAIN TISS: NORMAL
P AERUGINOSA DNA BAL NAA+NON-PRB-NCNCRNG: NOT DETECTED
PROTEUS SP DNA BAL NAA+NON-PRB-NCNCRNG: NOT DETECTED
QT INTERVAL: 413 MS
QTC INTERVAL: 409 MS
RHINOVIRUS RNA SPEC NAA+PROBE: NOT DETECTED
RSV RNA NPH QL NAA+NON-PROBE: NOT DETECTED
S AUREUS DNA BAL NAA+NON-PRB-NCNCRNG: NOT DETECTED
S MARCESCENS DNA BAL NAA+NON-PRB-NCNCRNG: NOT DETECTED
S PNEUM DNA BAL NAA+NON-PRB-NCNCRNG: NOT DETECTED
S PYO DNA BAL NAA+NON-PRB-NCNCRNG: NOT DETECTED
VISUAL PRESENCE OF BLOOD: NORMAL

## 2023-09-13 PROCEDURE — 93005 ELECTROCARDIOGRAM TRACING: CPT | Performed by: ANESTHESIOLOGY

## 2023-09-13 PROCEDURE — 87252 VIRUS INOCULATION TISSUE: CPT | Performed by: STUDENT IN AN ORGANIZED HEALTH CARE EDUCATION/TRAINING PROGRAM

## 2023-09-13 PROCEDURE — 25010000002 PROPOFOL 10 MG/ML EMULSION: Performed by: NURSE ANESTHETIST, CERTIFIED REGISTERED

## 2023-09-13 PROCEDURE — 25010000002 FENTANYL CITRATE (PF) 50 MCG/ML SOLUTION: Performed by: NURSE ANESTHETIST, CERTIFIED REGISTERED

## 2023-09-13 PROCEDURE — 87205 SMEAR GRAM STAIN: CPT | Performed by: STUDENT IN AN ORGANIZED HEALTH CARE EDUCATION/TRAINING PROGRAM

## 2023-09-13 PROCEDURE — 31623 DX BRONCHOSCOPE/BRUSH: CPT | Performed by: STUDENT IN AN ORGANIZED HEALTH CARE EDUCATION/TRAINING PROGRAM

## 2023-09-13 PROCEDURE — 31628 BRONCHOSCOPY/LUNG BX EACH: CPT | Performed by: STUDENT IN AN ORGANIZED HEALTH CARE EDUCATION/TRAINING PROGRAM

## 2023-09-13 PROCEDURE — 88342 IMHCHEM/IMCYTCHM 1ST ANTB: CPT | Performed by: STUDENT IN AN ORGANIZED HEALTH CARE EDUCATION/TRAINING PROGRAM

## 2023-09-13 PROCEDURE — 88104 CYTOPATH FL NONGYN SMEARS: CPT | Performed by: STUDENT IN AN ORGANIZED HEALTH CARE EDUCATION/TRAINING PROGRAM

## 2023-09-13 PROCEDURE — 76000 FLUOROSCOPY <1 HR PHYS/QHP: CPT

## 2023-09-13 PROCEDURE — 88173 CYTOPATH EVAL FNA REPORT: CPT | Performed by: STUDENT IN AN ORGANIZED HEALTH CARE EDUCATION/TRAINING PROGRAM

## 2023-09-13 PROCEDURE — 87071 CULTURE AEROBIC QUANT OTHER: CPT | Performed by: STUDENT IN AN ORGANIZED HEALTH CARE EDUCATION/TRAINING PROGRAM

## 2023-09-13 PROCEDURE — 88341 IMHCHEM/IMCYTCHM EA ADD ANTB: CPT | Performed by: STUDENT IN AN ORGANIZED HEALTH CARE EDUCATION/TRAINING PROGRAM

## 2023-09-13 PROCEDURE — 87496 CYTOMEG DNA AMP PROBE: CPT | Performed by: STUDENT IN AN ORGANIZED HEALTH CARE EDUCATION/TRAINING PROGRAM

## 2023-09-13 PROCEDURE — C1726 CATH, BAL DIL, NON-VASCULAR: HCPCS | Performed by: STUDENT IN AN ORGANIZED HEALTH CARE EDUCATION/TRAINING PROGRAM

## 2023-09-13 PROCEDURE — 25010000002 SUGAMMADEX 200 MG/2ML SOLUTION: Performed by: NURSE ANESTHETIST, CERTIFIED REGISTERED

## 2023-09-13 PROCEDURE — 89051 BODY FLUID CELL COUNT: CPT | Performed by: STUDENT IN AN ORGANIZED HEALTH CARE EDUCATION/TRAINING PROGRAM

## 2023-09-13 PROCEDURE — 31624 DX BRONCHOSCOPE/LAVAGE: CPT | Performed by: STUDENT IN AN ORGANIZED HEALTH CARE EDUCATION/TRAINING PROGRAM

## 2023-09-13 PROCEDURE — 31629 BRONCHOSCOPY/NEEDLE BX EACH: CPT | Performed by: STUDENT IN AN ORGANIZED HEALTH CARE EDUCATION/TRAINING PROGRAM

## 2023-09-13 PROCEDURE — 87633 RESP VIRUS 12-25 TARGETS: CPT | Performed by: STUDENT IN AN ORGANIZED HEALTH CARE EDUCATION/TRAINING PROGRAM

## 2023-09-13 PROCEDURE — 25010000002 ONDANSETRON PER 1 MG: Performed by: NURSE ANESTHETIST, CERTIFIED REGISTERED

## 2023-09-13 PROCEDURE — 87206 SMEAR FLUORESCENT/ACID STAI: CPT | Performed by: STUDENT IN AN ORGANIZED HEALTH CARE EDUCATION/TRAINING PROGRAM

## 2023-09-13 PROCEDURE — 87116 MYCOBACTERIA CULTURE: CPT | Performed by: STUDENT IN AN ORGANIZED HEALTH CARE EDUCATION/TRAINING PROGRAM

## 2023-09-13 PROCEDURE — 31627 NAVIGATIONAL BRONCHOSCOPY: CPT | Performed by: STUDENT IN AN ORGANIZED HEALTH CARE EDUCATION/TRAINING PROGRAM

## 2023-09-13 PROCEDURE — 31654 BRONCH EBUS IVNTJ PERPH LES: CPT | Performed by: STUDENT IN AN ORGANIZED HEALTH CARE EDUCATION/TRAINING PROGRAM

## 2023-09-13 PROCEDURE — 71250 CT THORAX DX C-: CPT

## 2023-09-13 PROCEDURE — 88108 CYTOPATH CONCENTRATE TECH: CPT | Performed by: STUDENT IN AN ORGANIZED HEALTH CARE EDUCATION/TRAINING PROGRAM

## 2023-09-13 PROCEDURE — 25010000002 MIDAZOLAM PER 1MG: Performed by: ANESTHESIOLOGY

## 2023-09-13 PROCEDURE — 25010000002 DEXAMETHASONE PER 1 MG: Performed by: NURSE ANESTHETIST, CERTIFIED REGISTERED

## 2023-09-13 PROCEDURE — 88305 TISSUE EXAM BY PATHOLOGIST: CPT | Performed by: STUDENT IN AN ORGANIZED HEALTH CARE EDUCATION/TRAINING PROGRAM

## 2023-09-13 PROCEDURE — 88312 SPECIAL STAINS GROUP 1: CPT | Performed by: STUDENT IN AN ORGANIZED HEALTH CARE EDUCATION/TRAINING PROGRAM

## 2023-09-13 PROCEDURE — 87102 FUNGUS ISOLATION CULTURE: CPT | Performed by: STUDENT IN AN ORGANIZED HEALTH CARE EDUCATION/TRAINING PROGRAM

## 2023-09-13 RX ORDER — FENTANYL CITRATE 50 UG/ML
INJECTION, SOLUTION INTRAMUSCULAR; INTRAVENOUS AS NEEDED
Status: DISCONTINUED | OUTPATIENT
Start: 2023-09-13 | End: 2023-09-13 | Stop reason: SURG

## 2023-09-13 RX ORDER — ONDANSETRON 2 MG/ML
INJECTION INTRAMUSCULAR; INTRAVENOUS AS NEEDED
Status: DISCONTINUED | OUTPATIENT
Start: 2023-09-13 | End: 2023-09-13 | Stop reason: SURG

## 2023-09-13 RX ORDER — PROPOFOL 10 MG/ML
VIAL (ML) INTRAVENOUS AS NEEDED
Status: DISCONTINUED | OUTPATIENT
Start: 2023-09-13 | End: 2023-09-13 | Stop reason: SURG

## 2023-09-13 RX ORDER — OXYCODONE HYDROCHLORIDE 5 MG/1
5 TABLET ORAL
Status: DISCONTINUED | OUTPATIENT
Start: 2023-09-13 | End: 2023-09-13 | Stop reason: HOSPADM

## 2023-09-13 RX ORDER — ACETAMINOPHEN 500 MG
1000 TABLET ORAL ONCE
Status: COMPLETED | OUTPATIENT
Start: 2023-09-13 | End: 2023-09-13

## 2023-09-13 RX ORDER — PROMETHAZINE HYDROCHLORIDE 12.5 MG/1
25 TABLET ORAL ONCE AS NEEDED
Status: DISCONTINUED | OUTPATIENT
Start: 2023-09-13 | End: 2023-09-13 | Stop reason: HOSPADM

## 2023-09-13 RX ORDER — ONDANSETRON 2 MG/ML
4 INJECTION INTRAMUSCULAR; INTRAVENOUS ONCE AS NEEDED
Status: DISCONTINUED | OUTPATIENT
Start: 2023-09-13 | End: 2023-09-13 | Stop reason: HOSPADM

## 2023-09-13 RX ORDER — LIDOCAINE HYDROCHLORIDE 20 MG/ML
INJECTION, SOLUTION EPIDURAL; INFILTRATION; INTRACAUDAL; PERINEURAL AS NEEDED
Status: DISCONTINUED | OUTPATIENT
Start: 2023-09-13 | End: 2023-09-13 | Stop reason: SURG

## 2023-09-13 RX ORDER — PHENYLEPHRINE HCL IN 0.9% NACL 1 MG/10 ML
SYRINGE (ML) INTRAVENOUS AS NEEDED
Status: DISCONTINUED | OUTPATIENT
Start: 2023-09-13 | End: 2023-09-13 | Stop reason: SURG

## 2023-09-13 RX ORDER — SODIUM CHLORIDE, SODIUM LACTATE, POTASSIUM CHLORIDE, CALCIUM CHLORIDE 600; 310; 30; 20 MG/100ML; MG/100ML; MG/100ML; MG/100ML
9 INJECTION, SOLUTION INTRAVENOUS CONTINUOUS PRN
Status: DISCONTINUED | OUTPATIENT
Start: 2023-09-13 | End: 2023-09-13 | Stop reason: HOSPADM

## 2023-09-13 RX ORDER — MIDAZOLAM HYDROCHLORIDE 2 MG/2ML
2 INJECTION, SOLUTION INTRAMUSCULAR; INTRAVENOUS ONCE
Status: COMPLETED | OUTPATIENT
Start: 2023-09-13 | End: 2023-09-13

## 2023-09-13 RX ORDER — PROMETHAZINE HYDROCHLORIDE 25 MG/1
25 SUPPOSITORY RECTAL ONCE AS NEEDED
Status: DISCONTINUED | OUTPATIENT
Start: 2023-09-13 | End: 2023-09-13 | Stop reason: HOSPADM

## 2023-09-13 RX ORDER — ROCURONIUM BROMIDE 10 MG/ML
INJECTION, SOLUTION INTRAVENOUS AS NEEDED
Status: DISCONTINUED | OUTPATIENT
Start: 2023-09-13 | End: 2023-09-13 | Stop reason: SURG

## 2023-09-13 RX ORDER — MEPERIDINE HYDROCHLORIDE 25 MG/ML
12.5 INJECTION INTRAMUSCULAR; INTRAVENOUS; SUBCUTANEOUS
Status: DISCONTINUED | OUTPATIENT
Start: 2023-09-13 | End: 2023-09-13 | Stop reason: HOSPADM

## 2023-09-13 RX ORDER — DEXAMETHASONE SODIUM PHOSPHATE 4 MG/ML
INJECTION, SOLUTION INTRA-ARTICULAR; INTRALESIONAL; INTRAMUSCULAR; INTRAVENOUS; SOFT TISSUE AS NEEDED
Status: DISCONTINUED | OUTPATIENT
Start: 2023-09-13 | End: 2023-09-13 | Stop reason: SURG

## 2023-09-13 RX ADMIN — Medication 300 MCG: at 10:36

## 2023-09-13 RX ADMIN — SUGAMMADEX 200 MG: 100 INJECTION, SOLUTION INTRAVENOUS at 10:53

## 2023-09-13 RX ADMIN — DEXAMETHASONE SODIUM PHOSPHATE 8 MG: 4 INJECTION, SOLUTION INTRAMUSCULAR; INTRAVENOUS at 10:20

## 2023-09-13 RX ADMIN — ACETAMINOPHEN 1000 MG: 500 TABLET ORAL at 09:41

## 2023-09-13 RX ADMIN — PROPOFOL 150 MCG/KG/MIN: 10 INJECTION, EMULSION INTRAVENOUS at 10:00

## 2023-09-13 RX ADMIN — MIDAZOLAM HYDROCHLORIDE 2 MG: 1 INJECTION, SOLUTION INTRAMUSCULAR; INTRAVENOUS at 09:41

## 2023-09-13 RX ADMIN — Medication 100 MCG: at 10:27

## 2023-09-13 RX ADMIN — PROPOFOL 100 MG: 10 INJECTION, EMULSION INTRAVENOUS at 10:00

## 2023-09-13 RX ADMIN — LIDOCAINE HYDROCHLORIDE 80 MG: 20 INJECTION, SOLUTION EPIDURAL; INFILTRATION; INTRACAUDAL; PERINEURAL at 10:00

## 2023-09-13 RX ADMIN — ONDANSETRON 4 MG: 2 INJECTION INTRAMUSCULAR; INTRAVENOUS at 10:20

## 2023-09-13 RX ADMIN — FENTANYL CITRATE 50 MCG: 50 INJECTION, SOLUTION INTRAMUSCULAR; INTRAVENOUS at 10:00

## 2023-09-13 RX ADMIN — Medication 200 MCG: at 10:06

## 2023-09-13 RX ADMIN — SODIUM CHLORIDE, POTASSIUM CHLORIDE, SODIUM LACTATE AND CALCIUM CHLORIDE 9 ML/HR: 600; 310; 30; 20 INJECTION, SOLUTION INTRAVENOUS at 09:11

## 2023-09-13 RX ADMIN — ROCURONIUM BROMIDE 100 MG: 50 INJECTION INTRAVENOUS at 10:00

## 2023-09-13 RX ADMIN — Medication 100 MCG: at 10:18

## 2023-09-13 NOTE — ANESTHESIA PREPROCEDURE EVALUATION
Anesthesia Evaluation     Patient summary reviewed and Nursing notes reviewed   no history of anesthetic complications:   NPO Solid Status: > 8 hours  NPO Liquid Status: > 2 hours           Airway   Mallampati: III  TM distance: >3 FB  Neck ROM: full  No difficulty expected  Dental      Pulmonary - normal exam    breath sounds clear to auscultation  (+) COPD, asthma,shortness of breath, sleep apnea  Cardiovascular - normal exam  Exercise tolerance: good (4-7 METS)    Rhythm: regular  Rate: normal    (+) hypertension      Neuro/Psych- negative ROS  GI/Hepatic/Renal/Endo    (+) GERD    Musculoskeletal     Abdominal    Substance History - negative use     OB/GYN negative ob/gyn ROS         Other   arthritis,     ROS/Med Hx Other: PAT Nursing Notes unavailable.            OTHERWISE NORMAL ECG -  Sinus rhythm  Minimal ST depression, lateral leads       Anesthesia Plan    ASA 3     general     (Patient understands anesthesia not responsible for dental damage.)  intravenous induction     Anesthetic plan, risks, benefits, and alternatives have been provided, discussed and informed consent has been obtained with: patient.    Use of blood products discussed with patient .    Plan discussed with CRNA.      CODE STATUS:

## 2023-09-13 NOTE — PROCEDURES
Procedure:   Robotic navigational assisted bronchoscopy with radial probe endobronchial ultrasound  Transbronchial needle aspiration  Transbronchial lung biopsies, brushings, bronchoalveolar lavage, bronchial washings.    Bronchoscopy with clearance of airways     Pre-Operative Diagnosis: Lung nodule     Post-Operative Diagnosis: Same     Timeout performed     Anesthesia: General anesthesia     Procedure Details: The patient was consented for the procedure with all risk and benefit of the procedure explained in detail.  Patient was given the opportunity to ask questions and all concerns were answered. The bronchoscope was inserted into the main airway via the endotracheal tube. An anatomical survey was done of the main airways and the subsegmental bronchus.      Findings: First, fiberoptic bronchoscope airway inspection was performed.  Endotracheal tube was in adequate position in the mid thoracic trachea.  The trachea was normal in caliber and had no mucosal abnormalities. The left tracheobronchial tree appeared anatomically normal with no mucosal abnormalities. The right tracheobronchial tree appeared anatomically normal with no mucosal abnormalities.  All airways were evaluated and cleared.     Next the fiberoptic bronchoscope was removed and Ion robotic navigational bronchoscope was inserted into the endotracheal tube.  Using navigational guidance, we approach to the lung nodule in the JOSE of lung.  Identification of JOSE  lung nodule and appropriate positioning was confirmed via radial probe EBUS and fluoroscopy.  Under fluoroscopic guidance, transbronchial needle aspiration was performed at the lung nodule.  Radial probe EBUS was then inserted to again confirm appropriate position under fluoroscopic guidance.  Transbronchial lung biopsies were performed under fluoroscopic guidance at the lung nodule in the JOSE of the lung. Radial probe EBUS was then inserted to again confirm appropriate position under  fluoroscopic guidance.  Brushings  were performed under fluoroscopic guidance at the lung nodule. A bronchoalveolar lavage was performed using 15 mL aliquots of normal saline  instilled into JOSE lung nodule then aspirated back. There was 10 mL blood-tinged fluid in return.  Bronchial washings were collected.     After Ion navigational bronchoscope was removed, linear endobronchial ultrasound was inserted through the endotracheal tube. Using ultrasound, no significant lymph nodes were identified.     Findings:     Estimated Blood Loss: Less than 10 mL     Specimens:  Fine-needle aspiration of lung nodule JOSE lobe of lung using radial probe ultrasound  Transbronchial lung biopsies lung nodule JOSE lobe of lung using radial probe ultrasound  Brushings of lung nodule using radial probe ultrasound guidance  Bronchoalveolar lavage JOSE  Bronchial washings      Complications: None; patient tolerated the procedure well.     Disposition: Stable to discharge home.  Follow-up test results.

## 2023-09-13 NOTE — ANESTHESIA POSTPROCEDURE EVALUATION
Patient: Isis Mars    Procedure Summary       Date: 09/13/23 Room / Location: [facility] OR 04 / [facility] MAIN OR    Anesthesia Start: 0953 Anesthesia Stop: 1105    Procedure: BRONCHOSCOPY WITH ION ROBOT, REBUS, NEEDLE ASPIRATE, BRUSHINGS, BIOPSIES, BAL (Bronchus) Diagnosis:       Lung nodule      (Lung nodule [R91.1])    Surgeons: Hortencia Berger MD Provider: Latricia Davis MD    Anesthesia Type: general ASA Status: 3            Anesthesia Type: general    Vitals  Vitals Value Taken Time   /56 09/13/23 1124   Temp 36.1 °C (97 °F) 09/13/23 1105   Pulse 64 09/13/23 1125   Resp 18 09/13/23 1120   SpO2 92 % 09/13/23 1125   Vitals shown include unvalidated device data.        Post Anesthesia Care and Evaluation    Patient location during evaluation: bedside  Patient participation: complete - patient participated  Level of consciousness: awake  Pain management: adequate    Airway patency: patent  PONV Status: none  Cardiovascular status: acceptable and stable  Respiratory status: acceptable  Hydration status: acceptable    Comments: An Anesthesiologist personally participated in the most demanding procedures (including induction and emergence if applicable) in the anesthesia plan, monitored the course of anesthesia administration at frequent intervals and remained physically present and available for immediate diagnosis and treatment of emergencies.

## 2023-09-13 NOTE — DISCHARGE INSTRUCTIONS
DISCHARGE INSTRUCTIONS  BRONCHOSCOPY/  ENDOBRONCHIAL ULTRASOUND      For your procedure you had:  General Anesthesia (you may have a sore throat for the first 24 hours)    You may experience dizziness, drowsiness, or lightheadedness for several hours following surgery/procedure.  Do not stay alone today or tonight.  Limit your activity for 24 hours.  You should not drive or operate machinery or drink alcohol for 24 hours or while you are taking pain medication.  You should not sign legally binding documents.  Resume your diet slowly.  Follow any special dietary instructions you may have been given by your doctor.  NOTIFY YOUR DOCTOR IF YOU EXPERIENCE ANY OF THE FOLLOWING:  Temperature greater than 101 degrees Fahrenheit  Shaking Chills  Redness or excessive drainage from incision  Nausea, vomiting and/or pain that is not controlled by prescribed medications  Increase in bleeding or bleeding that is excessive  Unable to urinate in 6 hours after surgery  If unable to reach your doctor, please go to the closest Emergency Room     Following your bronchoscopy, you may experience a mild sore throat or cough up small amounts of blood.  Extremes of either should be reported to your doctor.  If you have sudden shortness of breath, chest pain, chest pressure that worsens over time or sharp chest pains that worsen with deep breaths or coughs go the ED or call 911.  Smokers are encouraged not to smoke for 6 to 8 hours after the procedure to decrease the risk of coughing and bleeding.  Nausea and vomiting can occur, but is not a common side effect of the procedure you have had today. If you experience any nausea or vomiting, take clear liquids for your next meal.  Coughing (slight dry cough to hacking cough) is completely expected for 3 to 4 days.  Pink/ blood tinged sputum is expected for several days ( or while coughing)  Notify MD or go to the ED if significant blood is found in the sputum.  Missing your  appointment/follow-up could lead to serious complications.    LAST DOSE OF PAIN MEDICATION:  _____________________________________

## 2023-09-14 ENCOUNTER — TELEPHONE (OUTPATIENT)
Dept: PULMONOLOGY | Facility: CLINIC | Age: 63
End: 2023-09-14
Payer: MEDICARE

## 2023-09-14 NOTE — TELEPHONE ENCOUNTER
Patient called this morning about getting her results from her bronch with ION. She has seen some come back on MyChart but she does not understand them. Please advise, thank you.

## 2023-09-14 NOTE — TELEPHONE ENCOUNTER
Called patient and informed them that the results were not finalized and will be reached out to once they are.

## 2023-09-15 LAB
BACTERIA SPEC AEROBE CULT: NO GROWTH
CYTO UR: NORMAL
CYTO UR: NORMAL
GRAM STN SPEC: NORMAL
GRAM STN SPEC: NORMAL
LAB AP CASE REPORT: NORMAL
LAB AP CASE REPORT: NORMAL
LAB AP CLINICAL INFORMATION: NORMAL
LAB AP CLINICAL INFORMATION: NORMAL
LAB AP SPECIAL STAINS: NORMAL
LAB AP SPECIAL STAINS: NORMAL
PATH REPORT.FINAL DX SPEC: NORMAL
PATH REPORT.FINAL DX SPEC: NORMAL
PATH REPORT.GROSS SPEC: NORMAL
PATH REPORT.GROSS SPEC: NORMAL

## 2023-09-17 NOTE — PROGRESS NOTES
Primary Care Provider  Rashida Piper APRN   Referring Provider  No ref. provider found    Patient Complaint  COPD and Follow-up (Bronch Results )      SUBJECTIVE    History of Presenting Illness  Isis Mars is a pleasant 63 y.o. female patient of Dr. Tapia who presents to [facility] for followup after bronchoscopy. Patient underwent ion robotic bronchoscopy on 9/13/2023 for lung nodule. Pathology and cytology have returned positive for squamous cell carcinoma.    Patient had a chest CT done 8/9/2023 which showed a interval enlargement of a left upper lobe nodule now measuring 1.0 cm. Follow-up PET scan showed hypermetabolic SUV 9.9 concerning for neoplasm. Patient was a previous heavy tobacco user, smoking 1 pack/day for about 25 years. She quit 15 years ago. She denies any symptoms. No hemoptysis, no weight loss, no lymphadenopathy, no night sweats. She denies family history of lung cancer. She used to work as a cleaning lady at a hospital. She uses Trelegy daily with as needed albuterol and feels like this regimen keeps her symptoms controlled.     At present time she is not having any denies dyspnea, coughing, wheezing, headaches, chest pain, weight loss or hemoptysis. Denies fevers, chills and night sweats. Isis Mars is able to perform ADLs without difficulties and denies any swollen glands/lymph nodes in the head or neck.    I have personally reviewed the review of systems, past family, social, medical and surgical histories; and agree with their findings.    Review of Systems  Constitutional symptoms:  Denied complaints   Ear, nose, throat: Denied complaints  Cardiovascular:  Denied complaints  Respiratory: Denied complaints  Gastrointestinal: Denied complaints  Musculoskeletal: Denied complaints    Family History   Problem Relation Age of Onset    Emphysema Father     Diabetes Brother         Social History     Socioeconomic History     Marital status: Single   Tobacco Use    Smoking status: Former     Packs/day: 1.00     Years: 30.00     Pack years: 30.00     Types: Cigarettes     Start date: 1975     Quit date: 2014     Years since quittin.1     Passive exposure: Past    Smokeless tobacco: Never    Tobacco comments:     25-50 PK YEARS   Vaping Use    Vaping Use: Never used   Substance and Sexual Activity    Alcohol use: Not Currently    Drug use: Never    Sexual activity: Not Currently     Partners: Male        Past Medical History:   Diagnosis Date    Arthritis     Asthma     Asthma, intrinsic     Atypical lobular hyperplasia of right breast     Bronchiectasis     Chronic bronchitis     COPD (chronic obstructive pulmonary disease)     INHALER    Emphysema of lung 23    GERD (gastroesophageal reflux disease)     Lung nodule     Primary central sleep apnea     Sleep apnea     SOB (shortness of breath)         Immunization History   Administered Date(s) Administered    COVID-19 (PFIZER) BIVALENT 12+YRS 2023    COVID-19 (PFIZER) Purple Cap Monovalent 2021, 2021, 2021    Flu Vaccine Quad PF >36MO 2016    Fluzone (or Fluarix & Flulaval for VFC) >6mos 2016, 2022, 2023    Influenza Injectable Mdck Pf Quad 10/11/2021    Influenza, Unspecified 2018, 10/11/2021    Pneumococcal Polysaccharide (PPSV23) 10/01/2013       Allergies   Allergen Reactions    Azithromycin Shortness Of Breath and Swelling    Sulfa Antibiotics Unknown - High Severity    Sulfamethoxazole Swelling    Doxycycline Rash    Sulfamethoxazole-Trimethoprim Unknown - High Severity     Makes pt feel weird           Current Outpatient Medications:     albuterol sulfate HFA (Ventolin HFA) 108 (90 Base) MCG/ACT inhaler, Inhale 2 puffs Every 6 (Six) Hours As Needed for Wheezing., Disp: 18 g, Rfl: 4    alendronate (FOSAMAX) 70 MG tablet, TAKE 1 TABLET BY MOUTH ONCE WEEKLY BEFORE BREAKFAST, ON EMPTY STOMACH; REMAIN UPRIGHT FOR  30 MINUTES; TAKE WITH 8OZ OF WATER, Disp: , Rfl:     aspirin 81 MG EC tablet, Take 1 tablet by mouth Daily. Last dose 9/8/23 per pt as direct by keanu, Disp: , Rfl:     azelastine (ASTELIN) 0.1 % nasal spray, SPRAY ONE SPRAY IN EACH NOSTRIL EVERY DAY, Disp: , Rfl:     busPIRone (BUSPAR) 15 MG tablet, Take 1 tablet by mouth 3 (Three) Times a Day., Disp: , Rfl:     Calcium Carbonate 1500 (600 Ca) MG tablet, Take 1 tablet by mouth Daily., Disp: , Rfl:     EPINEPHrine (EPIPEN) 0.3 MG/0.3ML solution auto-injector injection, See Admin Instructions. Inject into the middle of the outer thigh and hold for 3 seconds as needed for severe allergic reaction then call 911 if used, Disp: , Rfl:     fluticasone (FLONASE) 50 MCG/ACT nasal spray, 2 sprays by Each Nare route Daily., Disp: 11.1 mL, Rfl: 5    furosemide (LASIX) 20 MG tablet, Take 1 tablet by mouth Daily., Disp: , Rfl:     hydrOXYzine (ATARAX) 50 MG tablet, Take 1 tablet by mouth Daily., Disp: , Rfl:     ibuprofen (ADVIL,MOTRIN) 800 MG tablet, Take 1 tablet by mouth 3 (Three) Times a Day As Needed. for pain, Disp: , Rfl:     levocetirizine (XYZAL) 5 MG tablet, Take 1 tablet by mouth Every Morning. (Patient taking differently: Take 1 tablet by mouth Every Evening.), Disp: 90 tablet, Rfl: 3    meclizine (ANTIVERT) 12.5 MG tablet, TAKE ONE TABLET BY MOUTH EVERY 8 HOURS AS NEEDED FOR FOR DIZZINESS - HOLD OTHER FOR ALLERGY MEDS WHEN IN USE, Disp: , Rfl:     meloxicam (MOBIC) 15 MG tablet, meloxicam 15 mg oral tablet take 1 tablet (15 mg) by oral route once daily   Active, Disp: , Rfl:     montelukast (SINGULAIR) 10 MG tablet, Take 1 tablet by mouth Every Night for 360 days., Disp: 90 tablet, Rfl: 3    omeprazole (priLOSEC) 40 MG capsule, Take 1 capsule by mouth Daily., Disp: , Rfl:     Trelegy Ellipta 200-62.5-25 MCG/ACT aerosol powder , Inhale 1 puff Daily., Disp: 1 each, Rfl: 11    vitamin D3 125 MCG (5000 UT) capsule capsule, Take 1 capsule by mouth Daily., Disp: ,  "Rfl:     Daliresp 500 MCG tablet tablet, Take 1 tablet by mouth Daily for 90 days., Disp: 90 tablet, Rfl: 3           Vital Signs   /70 (BP Location: Right arm, Patient Position: Sitting, Cuff Size: Large Adult)   Pulse 95   Temp 98.6 °F (37 °C) (Temporal)   Resp 16   Ht 165.1 cm (65\")   Wt 92.9 kg (204 lb 12.8 oz)   SpO2 91% Comment: room air  BMI 34.08 kg/m²       OBJECTIVE    Physical Exam  Vital Signs Reviewed   WDWN, Alert, NAD.    HEENT:  PERRL, EOMI.  OP, nares clear  Neck:  Supple, no JVD, no thyromegaly  Chest:  good aeration, clear to auscultation bilaterally, tympanic to percussion bilaterally, no work of breathing noted  CV: RRR, no MGR, pulses 2+, equal.  Abd:  Soft, NT, ND, + BS, no HSM  EXT:  no clubbing, no cyanosis, no edema  Neuro:  A&Ox3, CN grossly intact, no focal deficits.  Skin: No rashes or lesions noted    Results Review  I have personally reviewed the prior office notes, hospital records, labs, and diagnostics.  Tissue Pathology Exam: [id number]  Order: [id number]  Status: Final result       Visible to patient: No (scheduled for 9/18/2023  1:37 PM)       Next appt: 09/18/2023 at 02:00 PM in Pulmonology (JO-ANN Lu)       Dx: Lung nodule    Specimen Information: Lung, Left Upper Lobe; Tissue   0 Result Notes      Component    Case Report   Surgical Pathology Report                         Case: [id number]                                   Authorizing Provider:  Hortencia Berger MD           Collected:           09/13/2023 10:30 AM           Ordering Location:     [facility] MAIN Received:            09/14/2023 10:24 AM                                  OR                                                                           Pathologist:           Sarina Clemons MD                                                     Specimen:    Lung, Left Upper Lobe, JOSE BIOPSIES                                                        Clinical Information    Lung nodule " "  Final Diagnosis   Lung, left upper lobe, biopsy:               -Squamous cell carcinoma     Comment:  Charlene Villalobos and Sarina Carter in Dr. Berger's office were notified of the above positive (malignant) diagnosis at 13:35 EDT on 9/15/2023 by Dr. Clemons.  Per policy, reflex testing has been ordered, and the results will be separately reported.  See also concurrently acquired cytology case [id number].      Electronically signed by Sarina Clemons MD on 9/15/2023 at 1337   Gross Description    1. Lung, Left Upper Lobe.  Received in formalin and labeled \" left upper lobe\" is a 0.7 cm aggregate of tan soft tissue fragments. The specimen is entirely submitted in one cassette.   ROHIT   Special Stains    In order to further characterize the malignant cells, immunoperoxidase stains were performed, yielding the following results: Tumor cells are positive for CK5/6 and p40, and negative for TTF-1 and Napsin A, supporting the above diagnosis.     All immunohistochemical/cytochemical stains (IHC) are performed on separate slides per different antibody unless otherwise specified in the documentation that a cocktail (multiple stain) was performed.  Controls are appropriate.      Microscopic Description    Microscopic examination performed.   Resulting Agency [facility]              Specimen Collected: 09/13/23 10:30 EDT Last Resulted: 09/15/23 13:37 EDT           Non-gynecologic Cytology: [id number]  Order: [id number]  Status: Final result       Visible to patient: No (scheduled for 9/18/2023  1:36 PM)       Next appt: 09/18/2023 at 02:00 PM in Pulmonology (JO-ANN Lu)       Dx: Lung nodule    Specimen Information: A: Lung, Left Upper Lobe; Brushing    C: Lung, Left Upper Lobe; Brushing    D: Lung, Left Upper Lobe; Brushing   0 Result Notes      Component    Case Report   Medical Cytology Report                           Case: [id number]                                   Authorizing Provider:  Celine, " MD Hortencia           Collected:           09/13/2023 10:25 AM           Ordering Location:     [facility] MAIN Received:            09/14/2023 10:22 AM                                  OR                                                                           Pathologist:           Sarina Clemons MD                                                     Specimens:   1) - Lung, Left Upper Lobe, JOSE BRUSHINGS                                                            2) - Lung, Left Upper Lobe, JOSE MASS NEEDLE ASPIRATE - CELL BLOCK                                    3) - Lung, Left Upper Lobe, JOSE BAL                                                        Final Diagnosis   1. Lung, left upper lobe, bronchial brushing:               - Atypical               - Atypical cells, many of which are not well-visualized due to obscuring blood        2.  Lung, left upper lobe mass, FNA:               -Suspicious for malignancy               -Few atypical cells, suspicious for squamous cell carcinoma        3. Lung, left upper lobe, BAL:               - Atypical               - Atypical cells, not well-visualized due to obscuring blood  - GMS stain, negative for fungal forms and Pneumocystis           Comment: See also concurrently acquired biopsy [id number].   Electronically signed by Sarina Clemons MD on 9/15/2023 at 1336   Clinical Information    Lung nodule.     Check for pneumocystis and fungus.   Gross Description    1. Lung, Left Upper Lobe.  Bronchial Brushings, left upper lobe       2 prepared smears received in ETOH.     2. Lung, Left Upper Lobe.  Ion-Guided Fine Needle Aspiration, left upper lobe mass       2 rapid Diff-Quik stained slides reviewed in OR by the cytotechnologist for the determination of cellular adequacy.  In addition to the 2 rapid Diff-Quik stained slides, 2 additional pap stained slides were collected in ETOH and a cytofixative vial containing multiple passes for cell block  preparation are submitted for Cytology (a cell block is prepared in addition to the 4 total prepared smears).     3. Lung, Left Upper Lobe.  BAL, left upper lobe       5 cc of bloody fluid received (1 cytospin prep and GMS-P/F prepared).      Microscopic Description    Microscopic examination performed.   Special Stains    Comment:  A special stain for GMS was performed to aid in the detection of fungal forms.  Controls are appropriate.      Resulting Agency [facility]              Specimen Collected: 09/13/23 10:25 EDT Last Resulted: 09/15/23 13:36 EDT           AFB Stain - No Culture - Brushing, Lung, Left Upper Lobe  Order: [id number]  Status: Final result       Visible to patient: Yes (seen)       Next appt: 09/18/2023 at 02:00 PM in Pulmonology (Estela Vyas, APRN)       Dx: Lung nodule    Specimen Information: Lung, Left Upper Lobe; Brushing   0 Result Notes  AFB Stain No acid fast bacilli seen              Resulting Agency: [facility]           Specimen Collected: 09/13/23 10:25 EDT Last Resulted: 09/13/23 15:47 EDT           Gram Stain - No Culture  Order: [id number]  Status: Final result       Visible to patient: Yes (seen)       Next appt: 09/18/2023 at 02:00 PM in Pulmonology (Estela Vyas, JO-ANN)    Specimen Information: Lung, Left Upper Lobe; Brushing   0 Result Notes  Gram Stain Moderate (3+) WBCs seen      No organisms seen              Resulting Agency: [facility]           Specimen Collected: 09/13/23 10:28 EDT Last Resulted: 09/13/23 14:04 EDT           Virus Culture - Lavage, Lung, Left Upper Lobe  Order: [id number]  Status: Preliminary result       Visible to patient: No (not released)       Next appt: 09/18/2023 at 02:00 PM in Pulmonology (Estela Vyas, JO-ANN)       Dx: Lung nodule    Specimen Information: Lung, Left Upper Lobe; Lavage   0 Result Notes      Component    Viral Culture, General Comment P    Comment: Preliminary Report:  No virus isolated at 24 hours. Next report to follow after 4 days.    Resulting Agency [facility]           Narrative  Performed by: [facility]  Performed at:  01 - [facility] [address]  [id number]  : Cheyanne Brumfield MD, Phone:  [phone]      Specimen Collected: 09/13/23 10:46 EDT Last Result           AFB Culture - Lavage, Lung, Left Upper Lobe  Order: [id number]  Status: Preliminary result       Visible to patient: No (not released)       Next appt: 09/18/2023 at 02:00 PM in Pulmonology (Estela Vyas, JO-ANN)       Dx: Lung nodule    Specimen Information: Lung, Left Upper Lobe; Lavage   0 Result Notes  AFB Stain No acid fast bacilli seen on direct smear      No acid fast bacilli seen on concentrated smear              Resulting Agency: [facility]           Specimen Collected: 09/13/23 10:46 EDT Last Resulted: 09/14/23 10:32 EDT           BAL Culture, Quantitative - Lavage, Lung, Left Upper Lobe  Order: [id number]  Status: Final result       Visible to patient: Yes (seen)       Next appt: 09/18/2023 at 02:00 PM in Pulmonology (Estela Vyas, JO-ANN)       Dx: Lung nodule    Specimen Information: Lung, Left Upper Lobe; Lavage   0 Result Notes  BAL Culture   Lab   No growth  [facility]               Gram Stain  Lab   Rare (1+) WBCs seen  [facility]      No organisms seen [facility]                    Specimen Collected: 09/13/23 10:46 EDT Last Resulted: 09/15/23 12:52 EDT           Contains abnormal data Eosinophil Smear - Lavage, Lung, Left Upper Lobe  Order: [id number]  Status: Final result       Visible to patient: Yes (seen)       Next appt: 09/18/2023 at 02:00 PM in Pulmonology (Estela Vyas, JO-ANN)       Dx: Lung nodule    Specimen Information: Lung, Left Upper Lobe; Lavage   0 Result Notes      Component  Ref Range & Units    Eosinophil Smear  0 - 0 % EOS/100 Cells 4 High    Resulting Agency  [facility]              Specimen Collected: 09/13/23 10:46 EDT Last Resulted: 09/13/23 18:09 EDT           Pneumonia Panel - Lavage, Lung, Left Upper  Lobe  Order: [id number]  Status: Final result       Visible to patient: Yes (seen)       Next appt: 09/18/2023 at 02:00 PM in Pulmonology (JO-ANN Lu)       Dx: Lung nodule    Specimen Information: Lung, Left Upper Lobe; Lavage   0 Result Notes      Component  Ref Range & Units    Escherichia coli PCR  Not Detected Not Detected   Acinetobacter calcoaceticus-baumannii complex PCR  Not Detected Not Detected   Enterobacter cloacae PCR  Not Detected Not Detected   Klebsiella oxytoca PCR  Not Detected Not Detected   Klebsiella pneumoniae group PCR  Not Detected Not Detected   Klebsiella aerogenes PCR  Not Detected Not Detected   Moraxella catarrhalis PCR  Not Detected Not Detected   Proteus species PCR  Not Detected Not Detected   Pseudomonas aeroginosa PCR  Not Detected Not Detected   Serratia marcescens PCR  Not Detected Not Detected   Staphylococcus aureus PCR  Not Detected Not Detected   Streptococcus pyogenes PCR  Not Detected Not Detected   Haemophilus influenzae PCR  Not Detected Not Detected   Streptococcus agalactiae PCR  Not Detected Not Detected   Streptococcus pneumoniae PCR  Not Detected Not Detected   Chlamydophila pneumoniae PCR  Not Detected Not Detected   Legionella pneumophilia PCR  Not Detected Not Detected   Mycoplasma pneumo by PCR  Not Detected Not Detected   ADENOVIRUS, PCR  Not Detected Not Detected   CTX-M Gene  Not Detected, N/A N/A   IMP Gene  Not Detected, N/A N/A   KPC Gene  Not Detected, N/A N/A   mecA/C and MREJ Gene  Not Detected, N/A N/A   NDM Gene  Not Detected, N/A N/A   OXA-48-like Gene  Not Detected, N/A N/A   VIM Gene  Not Detected, N/A N/A   Coronavirus  Not Detected Not Detected   Human Metapneumovirus  Not Detected Not Detected   Human Rhinovirus/Enterovirus  Not Detected Not Detected   Influenza A PCR  Not Detected Not Detected   Influenza B PCR  Not Detected Not Detected   RSV, PCR  Not Detected Not Detected   Parainfluenza virus PCR  Not Detected Not Detected   Resulting  Agency [facility]              Specimen Collected: 09/13/23 10:46 EDT Last Resulted: 09/13/23 12:43 EDT           Bronch Wash/BAL Differential - Lavage, Lung, Left Upper Lobe  Order: [id number]  Status: Final result       Visible to patient: Yes (seen)       Next appt: 09/18/2023 at 02:00 PM in Pulmonology (Estela Vyas, JO-ANN)       Dx: Lung nodule    Specimen Information: Lung, Left Upper Lobe; Lavage   0 Result Notes      Component  Ref Range & Units 4 d ago   Visual Presence of Blood Large/3+   Lymphocytes, Fluid  % 43   Neutrophils, Fluid  % 54   Eosinophils, Fluid  % 1   Macrophage, Fluid  % 2   Resulting Agency [facility]           Narrative  Performed by: [facility]  Normal Adults (Nonsmokers)    Alveolar Macrophages       >85%  Lymphocytes              10-15%  Neutrophils              <or=3%  Eosinophils              <or=1%  Bronchial Lining Cells   <or=5%      Clinical Interpretations for BAL    Eosinophils >or=25%       Eosinophilic Pneumoniae  Lymphocytes >or=50%       Consider Drug Rxn,Acute HP  Bloody lavage             Diffuse Alveolar Hemorrhage  Other Findings            Specific Dx or Non-diagnostic      Specimen Collected: 09/13/23 10:46 EDT Last Resulted: 09/13/23 12:13 EDT           NM PET/CT Skull Base to Mid Thigh [[id number]] (Order [id number])  Order  Status: Final result     Appointment Information    PACS Images     Radiology Images  Study Result    Narrative & Impression   PROCEDURE:  NM PET SKULL BASE TO MID THIGH     COMPARISON: [facility], CT, CT CHEST WO CONTRAST DIAGNOSTIC, 8/09/2023,   12:42.  [facility], CT, CT ABD W WO CONTRAST PELVIS W CONTRAST, 6/02/2023,   11:09.     INDICATIONS:  lung nodule     TECHNIQUE:    After obtaining the patient's consent, F-18 FDG was administered intravenously.  PET/CT   imaging was performed from skull to thigh with multi-planar imaging without oral or intravenous   contrast material, using a dedicated  integrated PET/CT scanner.       RADIONUCLIDE:     9.00 MCI   F18 FDG- I.V.  LABS:                          Blood Glucose 119 mg/dl  UPTAKE TIME:        47 mins  MEDIASTINAL BACKGROUND UPTAKE:  3.1     FINDINGS:          No abnormal activity in the neck.     No hypermetabolic mediastinal or hilar adenopathy.  Redemonstration of left upper lobe lung nodule   measuring 1.1 centimeters.  The nodule is spiculated and hypermetabolic maximum SUV 9.9.       On image 120, there is a 3 millimeter right lower lobe lung nodule too small to characterize..    There is scarring at the left lung base.  Mild emphysema.     Previously seen arterially enhancing left lobe liver lesion measuring 1.5 centimeters not as well   seen on the current noncontrast CT.  No hypermetabolic activity in.  There is mild activity in the   colon likely physiologic.     No hypermetabolic adenopathy.    There is diverticulosis and mild atherosclerosis.  No abnormal   osseous activity.     IMPRESSION:                 1. 1.1 centimeter spiculated left upper lobe nodule is hypermetabolic SUV 9.9 most concerning for   neoplasm.  Recommend tissue correlation.  2. Tiny 3 millimeter right lower lobe lung nodule too small to characterize by PET scan.  Recommend   continued follow-up.  3. Previously seen arterially enhancing left lobe liver lesion is not hypermetabolic.  4. Other findings as above.            JERMAINE ABDULLAHI MD         Electronically Signed and Approved By: JERMAINE ABDULLAHI MD on 9/01/2023 at 15:18        CT Chest Without Contrast Diagnostic [[id number]] (Order [id number])  Order  Status: Final result     Appointment Information    PACS Images     Radiology Images  Study Result    Narrative & Impression   PROCEDURE:  CT CHEST WO CONTRAST DIAGNOSTIC     COMPARISON: [facility], CT, CT ABD W WO CONTRAST PELVIS W CONTRAST,   6/02/2023, 11:09.  [facility], CT, CT CHEST WO CONTRAST DIAGNOSTIC,   5/08/2023,  12:40.     INDICATIONS:  F/U PULM NODULES, RECENT PNA DX, STILL DEPENDENT ON O2     TECHNIQUE:    CT images were created without the administration of contrast material.       PROTOCOL:     Standard imaging protocol performed                 RADIATION:      DLP: 466.2mGy*cm               Automated exposure control was utilized to minimize radiation dose.      FINDINGS:          Shikha/mediastinum:  No adenopathy.  Thoracic aorta normal in caliber.  No coronary calcification.    No pericardial effusion     Lungs/pleura:  Interval enlargement of left upper lobe nodule, now 1.0 cm with slight spiculation.    Stable 4 mm right upper lobe nodule.  No acute pulmonary abnormality.  Scarring in the left lung   base.  Underlying pulmonary emphysema.  No pleural effusion     Upper abdomen:  Unremarkable     Bones/soft tissues:  Scoliosis with no suspicious bone lesion     IMPRESSION:               Interval enlargement of left upper lobe nodule, now 1.0 cm.  The appearance is   concerning for malignancy.  Recommend PET-CT or tissue diagnosis            VELMA STRONG MD         Electronically Signed and Approved By: VELMA STRONG MD on 8/10/2023 at 11:24     CT Chest Without Contrast Diagnostic [PHQ002] (Order [id number])  Order  Status: Final result     Appointment Information    PACS Images     Radiology Images  Study Result    Narrative & Impression   PROCEDURE:  CT CHEST WO CONTRAST DIAGNOSTIC     COMPARISON: [facility], CT, CT LOW DOSE CHEST SCREENING, 3/06/2019, 14:05.    [facility], CT, CT LOW DOSE CHEST SCREENING, 8/14/2020, 11:38.  [facility], CT, CT CHEST LOW DOSE CANCER SCREENING WO, 5/16/2022, 13:49.  [facility], CT, CT CHEST LOW DOSE FOLLOW UP WITHOUT CONTRAST, 11/14/2022, 13:44.     INDICATIONS:  F/U LUNG NODULE, NO COMPLAINTS     TECHNIQUE:    CT images were created without the administration of contrast material.        PROTOCOL:     Standard imaging protocol performed                 RADIATION:      DLP: 410.5mGy*cm               Automated exposure control was utilized to minimize radiation dose.      FINDINGS:          Lungs/pleura:  Previously demonstrated 5 mm left upper lobe nodule has increased in size, now   measuring 7 mm.  No significant change in 4 mm right upper lobe nodule.  Previously describe semi   solid nodule in the left upper lobe, airspace disease in the right lower lobe, and 3 mm nodule in   the left lower lobe have resolved.  No new nodule demonstrated.  Scarring present in the lingula   and lung bases.  No acute pulmonary abnormality.  No pleural effusion.  Fat containing [place]   hernia on the right     Shikha/mediastinum:  No adenopathy.  Thoracic aorta normal in caliber.  No coronary calcification.    No pericardial effusion     Possible low-attenuation lesions in the left hepatic lobe, not clearly present over the comparison   interval      Bones/soft tissues:  No suspicious bone lesion     IMPRESSION:                    1. Interval increase in size of left upper lobe nodule, now 7 mm.  This may be a growing infectious   or malignant lesion.  This is likely too small to confidently characterize with metabolic imaging.    Recommend follow-up chest CT in 3 months     2. Stable 4 mm nodule in the right upper lobe.  Additional opacities that were new on the prior   study have resolved     3. Indeterminate liver lesions.  Recommend dedicated liver MRI or CT              VELMA STRONG MD         Electronically Signed and Approved By: VELMA STRONG MD on 5/08/2023 at 15:45                             ASSESSMENT         Patient Active Problem List   Diagnosis    Hearing loss of right ear    Chronic obstructive pulmonary disease    Hypoxemic respiratory failure, chronic    NE (obstructive sleep apnea)    Tobacco abuse, in remission    COVID-19 virus detected    Dyspnea    Encounter for immunization     Gastroesophageal reflux disease    Lung nodule       Encounter Diagnoses   Name Primary?    Squamous cell carcinoma lung, left Yes    Lung nodule     Chronic obstructive pulmonary disease, unspecified COPD type     Tobacco abuse, in remission     Hypoxemic respiratory failure, chronic     Need for vaccination       PLAN  -MRI of brain with and without contrast, will notify of results as available  -PFT, will notify of results as available  -referral to cardiothoracic surgery, appointment 10/3/2023 with Dr. Westbrook  -referral to oncology  -referral to radiation oncology  -followup in 5 weeks to finalize bronchoscopy results or sooner if needed  -Continue with maintenance inhaler Trelegy 200 one puff daily rinsing out mouth after use to prevent oral thrush and as needed albuterol inhaler for shortness of air or wheeze   -Annual flu vaccine administered today    Diagnoses and all orders for this visit:    1. Squamous cell carcinoma lung, left (Primary)  -     Complete PFT - Pre & Post Bronchodilator; Future  -     MRI Brain With & Without Contrast; Future  -     Ambulatory Referral to Radiation Oncology-[place]  -     Ambulatory Referral to Oncology  -     Ambulatory Referral to Cardiothoracic Surgery    2. Lung nodule    3. Chronic obstructive pulmonary disease, unspecified COPD type    4. Tobacco abuse, in remission    5. Hypoxemic respiratory failure, chronic    6. Need for vaccination  -     Fluzone >6 Months ([id number])    Other orders  -     SCANNED - INFLUENZA        Smoking status:former, quit 2014  Vaccination status:up to date with COVID and flu vaccine and pneumococcal vaccine  Medications personally reviewed    Follow Up  Return in about 5 weeks (around 10/23/2023).    Patient was given instructions and counseling regarding her condition or for health maintenance advice. Please see specific information pulled into the AVS if appropriate.

## 2023-09-18 ENCOUNTER — TELEPHONE (OUTPATIENT)
Dept: SURGERY | Facility: CLINIC | Age: 63
End: 2023-09-18
Payer: MEDICARE

## 2023-09-18 ENCOUNTER — TELEPHONE (OUTPATIENT)
Dept: PULMONOLOGY | Facility: CLINIC | Age: 63
End: 2023-09-18

## 2023-09-18 ENCOUNTER — OFFICE VISIT (OUTPATIENT)
Dept: PULMONOLOGY | Facility: CLINIC | Age: 63
End: 2023-09-18
Payer: MEDICARE

## 2023-09-18 VITALS
WEIGHT: 204.8 LBS | BODY MASS INDEX: 34.12 KG/M2 | TEMPERATURE: 98.6 F | OXYGEN SATURATION: 91 % | HEIGHT: 65 IN | DIASTOLIC BLOOD PRESSURE: 70 MMHG | SYSTOLIC BLOOD PRESSURE: 124 MMHG | RESPIRATION RATE: 16 BRPM | HEART RATE: 95 BPM

## 2023-09-18 DIAGNOSIS — F17.201 TOBACCO ABUSE, IN REMISSION: ICD-10-CM

## 2023-09-18 DIAGNOSIS — J44.9 CHRONIC OBSTRUCTIVE PULMONARY DISEASE, UNSPECIFIED COPD TYPE: ICD-10-CM

## 2023-09-18 DIAGNOSIS — J96.11 HYPOXEMIC RESPIRATORY FAILURE, CHRONIC: ICD-10-CM

## 2023-09-18 DIAGNOSIS — Z23 NEED FOR VACCINATION: ICD-10-CM

## 2023-09-18 DIAGNOSIS — R91.1 LUNG NODULE: ICD-10-CM

## 2023-09-18 DIAGNOSIS — C34.92 SQUAMOUS CELL CARCINOMA LUNG, LEFT: Primary | ICD-10-CM

## 2023-09-18 LAB
CMV DNA SPEC QL NAA+PROBE: NEGATIVE
SPECIMEN SOURCE: NORMAL

## 2023-09-18 PROCEDURE — 1159F MED LIST DOCD IN RCRD: CPT | Performed by: NURSE PRACTITIONER

## 2023-09-18 PROCEDURE — 1160F RVW MEDS BY RX/DR IN RCRD: CPT | Performed by: NURSE PRACTITIONER

## 2023-09-18 PROCEDURE — 90686 IIV4 VACC NO PRSV 0.5 ML IM: CPT | Performed by: NURSE PRACTITIONER

## 2023-09-18 PROCEDURE — G0008 ADMIN INFLUENZA VIRUS VAC: HCPCS | Performed by: NURSE PRACTITIONER

## 2023-09-18 PROCEDURE — 99214 OFFICE O/P EST MOD 30 MIN: CPT | Performed by: NURSE PRACTITIONER

## 2023-09-18 NOTE — TELEPHONE ENCOUNTER
Left voicemail to schedule new patient appointment with Dr Westbrook possibly tomorrow 9/19 in [place].

## 2023-09-18 NOTE — TELEPHONE ENCOUNTER
Caller: [facility] - CANDANCE WEBSTER    Relationship to patient: Other    Best call back number: [phone]*    Patient is needing: COLEEN CALLED REGARDING THE PATIENT'S VACCINE RECORD. YODIT THE NP CALLED WITH THE FOLLOWING RECORD: 10/1/13 PATIENT RECEIVED NUMONVAX [phone]

## 2023-09-18 NOTE — TELEPHONE ENCOUNTER
Called patients primary care provider and spoke with Melissa. She was putting in note for nurse to (check if patient has received pneumo vaccine) nurse to return my call.

## 2023-09-19 ENCOUNTER — OFFICE VISIT (OUTPATIENT)
Dept: OTHER | Facility: HOSPITAL | Age: 63
End: 2023-09-19
Payer: MEDICARE

## 2023-09-19 ENCOUNTER — TELEPHONE (OUTPATIENT)
Dept: PULMONOLOGY | Facility: CLINIC | Age: 63
End: 2023-09-19

## 2023-09-19 VITALS
DIASTOLIC BLOOD PRESSURE: 82 MMHG | WEIGHT: 205 LBS | HEART RATE: 71 BPM | SYSTOLIC BLOOD PRESSURE: 124 MMHG | OXYGEN SATURATION: 98 % | BODY MASS INDEX: 34.16 KG/M2 | HEIGHT: 65 IN

## 2023-09-19 DIAGNOSIS — R06.02 SHORTNESS OF BREATH: ICD-10-CM

## 2023-09-19 DIAGNOSIS — C34.12 MALIGNANT NEOPLASM OF UPPER LOBE OF LEFT LUNG: Primary | ICD-10-CM

## 2023-09-19 PROCEDURE — G0463 HOSPITAL OUTPT CLINIC VISIT: HCPCS | Performed by: THORACIC SURGERY (CARDIOTHORACIC VASCULAR SURGERY)

## 2023-09-19 PROCEDURE — 1159F MED LIST DOCD IN RCRD: CPT | Performed by: THORACIC SURGERY (CARDIOTHORACIC VASCULAR SURGERY)

## 2023-09-19 PROCEDURE — 1160F RVW MEDS BY RX/DR IN RCRD: CPT | Performed by: THORACIC SURGERY (CARDIOTHORACIC VASCULAR SURGERY)

## 2023-09-19 RX ORDER — DIAZEPAM 5 MG/1
5 TABLET ORAL ONCE AS NEEDED
Qty: 2 TABLET | Refills: 0 | Status: SHIPPED | OUTPATIENT
Start: 2023-09-19

## 2023-09-19 NOTE — TELEPHONE ENCOUNTER
PATIENT HAS AN APPT AT THE [facility] ON OCTOBER 3, 2023 AT 1:30. NEED TO RELAY TO PATIENT.

## 2023-09-19 NOTE — LETTER
October 1, 2023       No Recipients    Patient: Isis Mars   YOB: 1960   Date of Visit: 9/19/2023     Dear JO-ANN Gomez:       Thank you for referring Isis Mars to me for evaluation. Below are the relevant portions of my assessment and plan of care.    If you have questions, please do not hesitate to call me. I look forward to following Isis along with you.         Sincerely,        Keeley Westbrook MD        CC:   No Recipients    Keeley Westbrook MD  10/01/23 1828  Sign when Signing Visit  Chief Complaint  Non-small cell lung cancer    Subjective        Isis Mars presents to [facility]  History of Present Illness    Ms. Mars is a pleasant 63-year-old lady who presents with a newly diagnosed squamous cell carcinoma.    The patient reports she had COVID-19 in 08/2022. She states she was fine until she had COVID-19. She states she has a history of emphysema and scarring in her lungs. She denies a history of heart trouble. She reports it has been a while since she had a stress test. She states she has had EKGs and an echocardiogram done over a year and a half ago. She notes they did not say anything about it. She reports she does not like MRIs. She reports her daughter in law informed her of an open MRI. She adds JO-ANN Lu has ordered her MRIs previously.  She states she is claustrophobic. She notes her oncologist is scheduled in [facility] in [place], [place]. She reports she has sleep apnea and uses a CPAP. She states her oxygen is hooked into it, and she uses it at night. She reports she has had sleep apnea for a while. She states she used to smoke; however, she notes she quit in 2014. She notes she smoked about a pack a day for 30 or so years. She reports she has had a couple of different surgeries. She notes her father and uncle had emphysema; however, she adds no lung cancer. She reports she is supposed to get  "some lung function studies on 09/26/2023. She states she had pneumonia and was put on oxygen at night.  She states her pulmonologist told her if she thought she could take it, she has slept without it a bunch of time just in her CPAP. She reports she has never done radiation.     Objective   Vital Signs:  /82   Pulse 71   Ht 165.1 cm (65\")   Wt 93 kg (205 lb)   SpO2 98%   BMI 34.11 kg/m²   Estimated body mass index is 34.11 kg/m² as calculated from the following:    Height as of this encounter: 165.1 cm (65\").    Weight as of this encounter: 93 kg (205 lb).             Physical Exam  Vitals and nursing note reviewed.   Constitutional:       Appearance: She is well-developed.   HENT:      Head: Normocephalic and atraumatic.      Nose: Nose normal.   Eyes:      Conjunctiva/sclera: Conjunctivae normal.   Cardiovascular:      Rate and Rhythm: Normal rate.   Pulmonary:      Effort: Pulmonary effort is normal.   Abdominal:      Palpations: Abdomen is soft.   Musculoskeletal:      Cervical back: Neck supple.   Skin:     General: Skin is warm and dry.   Neurological:      Mental Status: She is alert and oriented to person, place, and time.   Psychiatric:         Behavior: Behavior normal.         Thought Content: Thought content normal.         Judgment: Judgment normal.      Result Review :        Result Review     I have independently reviewed the PET CT scan performed on 08/31/2023 which demonstrates a hypermetabolic 1.1 cm left upper lobe nodule with an SUV of 9.9. There is a 3 mm right lower lobe nodule that is too small to characterize.    I have independently reviewed the CT scan performed on 08/09/2023 which demonstrates a 1.1 cm left upper lobe nodule. There is a stable 3 mm right sided nodule. No mediastinal or hilar lymphadenopathy. No pleural pericardial effusion.           Assessment and Plan   Diagnoses and all orders for this visit:    1. Malignant neoplasm of upper lobe of left lung (Primary)  -   "   Stress Test With Myocardial Perfusion - One Day; Future  -     diazePAM (VALIUM) 5 MG tablet; Take 1 tablet by mouth 1 (One) Time As Needed for Anxiety for up to 2 doses. May repeat  Dispense: 2 tablet; Refill: 0    2. Shortness of breath  -     Stress Test With Myocardial Perfusion - One Day; Future      Ms. Mars is a pleasant 63-year-old lady with a 1.1 cm squamous cell carcinoma of the left upper lobe, [id number] by PET CT scan. She will need pulmonary functions and a stress echo for risk stratification prior to discussion of surgical resection. Her last echo demonstrated dilation of the left atrium as well as the right atrium and a decrease in the ventricular function. This was done in 2022. I would like for her to have a stress echo for risk stratification. I will plan to see her back in 2 weeks to discuss the results of her pulmonary function studies and stress test.     I spent 45 minutes caring for Isis on this date of service. This time includes time spent by me in the following activities:preparing for the visit, reviewing tests, obtaining and/or reviewing a separately obtained history, performing a medically appropriate examination and/or evaluation , counseling and educating the patient/family/caregiver, ordering medications, tests, or procedures, documenting information in the medical record, and independently interpreting results and communicating that information with the patient/family/caregiver  Follow Up   No follow-ups on file.  Patient was given instructions and counseling regarding her condition or for health maintenance advice. Please see specific information pulled into the AVS if appropriate.       Transcribed from ambient dictation for Keeley Westbrook MD by Kisha Narvaez.  09/20/23   08:31 EDT    Patient or patient representative verbalized consent to the visit recording.  I have personally performed the services described in this document as transcribed by the above individual, and  it is both accurate and complete.

## 2023-09-20 DIAGNOSIS — C34.12 MALIGNANT NEOPLASM OF UPPER LOBE OF LEFT LUNG: Primary | ICD-10-CM

## 2023-09-20 LAB
FUNGUS WND CULT: NORMAL
MYCOBACTERIUM SPEC CULT: NORMAL
NIGHT BLUE STAIN TISS: NORMAL
NIGHT BLUE STAIN TISS: NORMAL

## 2023-09-20 NOTE — PROGRESS NOTES
"Chief Complaint  Non-small cell lung cancer    Subjective        Isis Mars presents to [facility]  History of Present Illness    Ms. Mars is a pleasant 63-year-old lady who presents with a newly diagnosed squamous cell carcinoma.    The patient reports she had COVID-19 in 08/2022. She states she was fine until she had COVID-19. She states she has a history of emphysema and scarring in her lungs. She denies a history of heart trouble. She reports it has been a while since she had a stress test. She states she has had EKGs and an echocardiogram done over a year and a half ago. She notes they did not say anything about it. She reports she does not like MRIs. She reports her daughter in law informed her of an open MRI. She adds JO-ANN Lu has ordered her MRIs previously.  She states she is claustrophobic. She notes her oncologist is scheduled in [facility] in [place], [place]. She reports she has sleep apnea and uses a CPAP. She states her oxygen is hooked into it, and she uses it at night. She reports she has had sleep apnea for a while. She states she used to smoke; however, she notes she quit in 2014. She notes she smoked about a pack a day for 30 or so years. She reports she has had a couple of different surgeries. She notes her father and uncle had emphysema; however, she adds no lung cancer. She reports she is supposed to get some lung function studies on 09/26/2023. She states she had pneumonia and was put on oxygen at night.  She states her pulmonologist told her if she thought she could take it, she has slept without it a bunch of time just in her CPAP. She reports she has never done radiation.     Objective   Vital Signs:  /82   Pulse 71   Ht 165.1 cm (65\")   Wt 93 kg (205 lb)   SpO2 98%   BMI 34.11 kg/m²   Estimated body mass index is 34.11 kg/m² as calculated from the following:    Height as of this encounter: 165.1 cm (65\").    Weight as " of this encounter: 93 kg (205 lb).             Physical Exam  Vitals and nursing note reviewed.   Constitutional:       Appearance: She is well-developed.   HENT:      Head: Normocephalic and atraumatic.      Nose: Nose normal.   Eyes:      Conjunctiva/sclera: Conjunctivae normal.   Cardiovascular:      Rate and Rhythm: Normal rate.   Pulmonary:      Effort: Pulmonary effort is normal.   Abdominal:      Palpations: Abdomen is soft.   Musculoskeletal:      Cervical back: Neck supple.   Skin:     General: Skin is warm and dry.   Neurological:      Mental Status: She is alert and oriented to person, place, and time.   Psychiatric:         Behavior: Behavior normal.         Thought Content: Thought content normal.         Judgment: Judgment normal.      Result Review :        Result Review     I have independently reviewed the PET CT scan performed on 08/31/2023 which demonstrates a hypermetabolic 1.1 cm left upper lobe nodule with an SUV of 9.9. There is a 3 mm right lower lobe nodule that is too small to characterize.    I have independently reviewed the CT scan performed on 08/09/2023 which demonstrates a 1.1 cm left upper lobe nodule. There is a stable 3 mm right sided nodule. No mediastinal or hilar lymphadenopathy. No pleural pericardial effusion.           Assessment and Plan   Diagnoses and all orders for this visit:    1. Malignant neoplasm of upper lobe of left lung (Primary)  -     Stress Test With Myocardial Perfusion - One Day; Future  -     diazePAM (VALIUM) 5 MG tablet; Take 1 tablet by mouth 1 (One) Time As Needed for Anxiety for up to 2 doses. May repeat  Dispense: 2 tablet; Refill: 0    2. Shortness of breath  -     Stress Test With Myocardial Perfusion - One Day; Future      Ms. Mars is a pleasant 63-year-old lady with a 1.1 cm squamous cell carcinoma of the left upper lobe, [id number] by PET CT scan. She will need pulmonary functions and a stress echo for risk stratification prior to discussion of  surgical resection. Her last echo demonstrated dilation of the left atrium as well as the right atrium and a decrease in the ventricular function. This was done in 2022. I would like for her to have a stress echo for risk stratification. I will plan to see her back in 2 weeks to discuss the results of her pulmonary function studies and stress test.     I spent 45 minutes caring for Isis on this date of service. This time includes time spent by me in the following activities:preparing for the visit, reviewing tests, obtaining and/or reviewing a separately obtained history, performing a medically appropriate examination and/or evaluation , counseling and educating the patient/family/caregiver, ordering medications, tests, or procedures, documenting information in the medical record, and independently interpreting results and communicating that information with the patient/family/caregiver  Follow Up   No follow-ups on file.  Patient was given instructions and counseling regarding her condition or for health maintenance advice. Please see specific information pulled into the AVS if appropriate.       Transcribed from ambient dictation for Keeley Westbrook MD by Kisha Narvaez.  09/20/23   08:31 EDT    Patient or patient representative verbalized consent to the visit recording.  I have personally performed the services described in this document as transcribed by the above individual, and it is both accurate and complete.

## 2023-09-22 ENCOUNTER — TELEPHONE (OUTPATIENT)
Dept: SURGERY | Facility: CLINIC | Age: 63
End: 2023-09-22

## 2023-09-22 ENCOUNTER — HOSPITAL ENCOUNTER (OUTPATIENT)
Dept: MRI IMAGING | Facility: HOSPITAL | Age: 63
Discharge: HOME | End: 2023-09-22
Admitting: NURSE PRACTITIONER
Payer: MEDICARE

## 2023-09-22 DIAGNOSIS — C34.92 SQUAMOUS CELL CARCINOMA LUNG, LEFT: ICD-10-CM

## 2023-09-22 LAB
CREAT BLDA-MCNC: 0.6 MG/DL
EGFRCR SERPLBLD CKD-EPI 2021: 100.4 ML/MIN/1.73
VIRUS SPEC CULT: NORMAL

## 2023-09-22 PROCEDURE — 70553 MRI BRAIN STEM W/O & W/DYE: CPT

## 2023-09-22 PROCEDURE — A9577 INJ MULTIHANCE: HCPCS | Performed by: NURSE PRACTITIONER

## 2023-09-22 PROCEDURE — 0 GADOBENATE DIMEGLUMINE 529 MG/ML SOLUTION: Performed by: NURSE PRACTITIONER

## 2023-09-22 PROCEDURE — 82565 ASSAY OF CREATININE: CPT

## 2023-09-22 RX ADMIN — GADOBENATE DIMEGLUMINE 20 ML: 529 INJECTION, SOLUTION INTRAVENOUS at 14:30

## 2023-09-22 NOTE — TELEPHONE ENCOUNTER
Caller: Isis Mars    Relationship to patient: Self    Best call back number: [phone]    Chief complaint: PT ASKING FOR FOLLOW UP TO BE SCHEDULED IN [place]     Type of visit: FOLLOW UP    Requested date: 10/3/23    If rescheduling, when is the original appointment: 10/2/23    Additional notes: PLEASE CONTACT PT TO LET HER KNOW IF THIS IS POSSIBLE-IF PT DOES NOT ANSWER PLEASE LEAVE VM. THANKS

## 2023-09-25 DIAGNOSIS — R90.89 ABNORMAL FINDING ON MRI OF BRAIN: Primary | ICD-10-CM

## 2023-09-25 LAB
CYTO UR: NORMAL
LAB AP CASE REPORT: NORMAL
LAB AP CLINICAL INFORMATION: NORMAL
LAB AP SPECIAL STAINS: NORMAL
Lab: NORMAL
PATH REPORT.ADDENDUM SPEC: NORMAL
PATH REPORT.FINAL DX SPEC: NORMAL
PATH REPORT.GROSS SPEC: NORMAL

## 2023-09-26 ENCOUNTER — HOSPITAL ENCOUNTER (OUTPATIENT)
Dept: RESPIRATORY THERAPY | Facility: HOSPITAL | Age: 63
Discharge: HOME | End: 2023-09-26
Admitting: NURSE PRACTITIONER
Payer: MEDICARE

## 2023-09-26 DIAGNOSIS — C34.92 SQUAMOUS CELL CARCINOMA LUNG, LEFT: ICD-10-CM

## 2023-09-26 PROCEDURE — 94729 DIFFUSING CAPACITY: CPT

## 2023-09-26 PROCEDURE — 94010 BREATHING CAPACITY TEST: CPT

## 2023-09-26 PROCEDURE — 94727 GAS DIL/WSHOT DETER LNG VOL: CPT

## 2023-09-26 RX ORDER — ALBUTEROL SULFATE 2.5 MG/3ML
2.5 SOLUTION RESPIRATORY (INHALATION) ONCE
Status: DISCONTINUED | OUTPATIENT
Start: 2023-09-26 | End: 2023-09-27 | Stop reason: HOSPADM

## 2023-09-26 NOTE — TELEPHONE ENCOUNTER
Caller: Isis Mars    Relationship: Self    Best call back number: [phone]     Who are you requesting to speak with (clinical staff, provider,  specific staff member): SCHEDULING     What was the call regarding: PATIENT WOULD LIKE AN UPDATE ON SCHEDULING.    Is it okay if the provider responds through MyChart: PATIENT WOULD LIKE A CALL BACK.

## 2023-09-28 ENCOUNTER — HOSPITAL ENCOUNTER (OUTPATIENT)
Dept: NUCLEAR MEDICINE | Facility: HOSPITAL | Age: 63
Discharge: HOME | End: 2023-09-28
Payer: MEDICARE

## 2023-09-28 DIAGNOSIS — R06.02 SHORTNESS OF BREATH: ICD-10-CM

## 2023-09-28 DIAGNOSIS — C34.12 MALIGNANT NEOPLASM OF UPPER LOBE OF LEFT LUNG: ICD-10-CM

## 2023-09-28 LAB
BH CV IMMEDIATE POST TECH DATA BLOOD PRESSURE: NORMAL MMHG
BH CV IMMEDIATE POST TECH DATA HEART RATE: 114 BPM
BH CV IMMEDIATE POST TECH DATA OXYGEN SATS: 95 %
BH CV REST NUCLEAR ISOTOPE DOSE: 9.3 MCI
BH CV SIX MINUTE RECOVERY TECH DATA BLOOD PRESSURE: NORMAL
BH CV SIX MINUTE RECOVERY TECH DATA HEART RATE: 95 BPM
BH CV SIX MINUTE RECOVERY TECH DATA OXYGEN SATURATION: 91 %
BH CV STRESS BP STAGE 1: NORMAL
BH CV STRESS COMMENTS STAGE 1: NORMAL
BH CV STRESS DOSE REGADENOSON STAGE 1: 0.4
BH CV STRESS DURATION MIN STAGE 1: 0
BH CV STRESS DURATION SEC STAGE 1: 10
BH CV STRESS HR STAGE 1: 110
BH CV STRESS NUCLEAR ISOTOPE DOSE: 38 MCI
BH CV STRESS O2 STAGE 1: 94
BH CV STRESS PROTOCOL 1: NORMAL
BH CV STRESS RECOVERY BP: NORMAL MMHG
BH CV STRESS RECOVERY HR: 95 BPM
BH CV STRESS RECOVERY O2: 91 %
BH CV STRESS STAGE 1: 1
BH CV THREE MINUTE POST TECH DATA BLOOD PRESSURE: NORMAL MMHG
BH CV THREE MINUTE POST TECH DATA HEART RATE: 100 BPM
BH CV THREE MINUTE POST TECH DATA OXYGEN SATURATION: 92 %
LV EF NUC BP: 73 %
MAXIMAL PREDICTED HEART RATE: 156 BPM
PERCENT MAX PREDICTED HR: 73.72 %
QT INTERVAL: 413 MS
QTC INTERVAL: 409 MS
STRESS BASELINE BP: NORMAL MMHG
STRESS BASELINE HR: 70 BPM
STRESS O2 SAT REST: 91 %
STRESS PERCENT HR: 87 %
STRESS POST O2 SAT PEAK: 95 %
STRESS POST PEAK BP: NORMAL MMHG
STRESS POST PEAK HR: 115 BPM
STRESS TARGET HR: 133 BPM

## 2023-09-28 PROCEDURE — 0 TECHNETIUM TETROFOSMIN KIT: Performed by: THORACIC SURGERY (CARDIOTHORACIC VASCULAR SURGERY)

## 2023-09-28 PROCEDURE — A9502 TC99M TETROFOSMIN: HCPCS | Performed by: THORACIC SURGERY (CARDIOTHORACIC VASCULAR SURGERY)

## 2023-09-28 PROCEDURE — 93017 CV STRESS TEST TRACING ONLY: CPT

## 2023-09-28 PROCEDURE — 25010000002 REGADENOSON 0.4 MG/5ML SOLUTION: Performed by: THORACIC SURGERY (CARDIOTHORACIC VASCULAR SURGERY)

## 2023-09-28 PROCEDURE — 78452 HT MUSCLE IMAGE SPECT MULT: CPT

## 2023-09-28 RX ORDER — REGADENOSON 0.08 MG/ML
0.4 INJECTION, SOLUTION INTRAVENOUS
Status: COMPLETED | OUTPATIENT
Start: 2023-09-28 | End: 2023-09-28

## 2023-09-28 RX ADMIN — TETROFOSMIN 1 DOSE: 1.38 INJECTION, POWDER, LYOPHILIZED, FOR SOLUTION INTRAVENOUS at 08:51

## 2023-09-28 RX ADMIN — TETROFOSMIN 1 DOSE: 1.38 INJECTION, POWDER, LYOPHILIZED, FOR SOLUTION INTRAVENOUS at 09:54

## 2023-09-28 RX ADMIN — REGADENOSON 0.4 MG: 0.08 INJECTION, SOLUTION INTRAVENOUS at 09:54

## 2023-10-03 ENCOUNTER — OFFICE VISIT (OUTPATIENT)
Dept: OTHER | Facility: HOSPITAL | Age: 63
End: 2023-10-03
Payer: MEDICARE

## 2023-10-03 VITALS — OXYGEN SATURATION: 92 % | BODY MASS INDEX: 33.43 KG/M2 | HEART RATE: 78 BPM | HEIGHT: 66 IN | WEIGHT: 208 LBS

## 2023-10-03 DIAGNOSIS — R06.02 SHORTNESS OF BREATH: ICD-10-CM

## 2023-10-03 DIAGNOSIS — C34.12 MALIGNANT NEOPLASM OF UPPER LOBE OF LEFT LUNG: Primary | ICD-10-CM

## 2023-10-03 PROCEDURE — 1160F RVW MEDS BY RX/DR IN RCRD: CPT | Performed by: THORACIC SURGERY (CARDIOTHORACIC VASCULAR SURGERY)

## 2023-10-03 PROCEDURE — 1159F MED LIST DOCD IN RCRD: CPT | Performed by: THORACIC SURGERY (CARDIOTHORACIC VASCULAR SURGERY)

## 2023-10-03 PROCEDURE — G0463 HOSPITAL OUTPT CLINIC VISIT: HCPCS | Performed by: THORACIC SURGERY (CARDIOTHORACIC VASCULAR SURGERY)

## 2023-10-03 NOTE — LETTER
"October 6, 2023       No Recipients    Patient: Isis Mars   YOB: 1960   Date of Visit: 10/3/2023     Dear JO-ANN Gomez:       Thank you for referring Isis Mars to me for evaluation. Below are the relevant portions of my assessment and plan of care.    If you have questions, please do not hesitate to call me. I look forward to following Isis along with you.         Sincerely,        Keeley Westbrook MD        CC:   No Recipients    Keeley Westbrook MD  10/06/23 1936  Sign when Signing Visit  Chief Complaint  Non-small cell lung cancer    Subjective          Isis Mars presents to [facility]  History of Present Illness  Ms. Mars is a pleasant 63-year-old lady who presents for follow-up with a newly diagnosed T1b N0 M0 squamous cell carcinoma of the left upper lobe.    The patient reports she is doing well. She states she spoke to doctor on 10/02/2023, and was told she is not a candidate for chemotherapy right now. She states she has an appointment with her radiation doctor, Talat [place] MD today at 1:30 PM. She reports she has a lot of mucus that does not resolve with Mucinex. She reports a history of COPD, the chronic bronchitis type.  Objective   Vital Signs:   Pulse 78   Ht 167.6 cm (66\")   Wt 94.3 kg (208 lb)   SpO2 92%   BMI 33.57 kg/m²     Physical Exam  Vitals and nursing note reviewed.   Constitutional:       Appearance: She is well-developed.   HENT:      Head: Normocephalic and atraumatic.      Nose: Nose normal.   Eyes:      Conjunctiva/sclera: Conjunctivae normal.   Cardiovascular:      Rate and Rhythm: Normal rate.   Pulmonary:      Effort: Pulmonary effort is normal.   Abdominal:      Palpations: Abdomen is soft.   Musculoskeletal:      Cervical back: Neck supple.   Skin:     General: Skin is warm and dry.   Neurological:      Mental Status: She is alert and oriented to person, place, and time.   Psychiatric:    "      Behavior: Behavior normal.         Thought Content: Thought content normal.         Judgment: Judgment normal.        Result Review :            I have independently reviewed the pulmonary functions performed on 09/26/2023 which demonstrates an FEV1 of 0.9 or 37 percent predicted and a DLCO of 12 or 62 percent predicted.    Stress echo performed on 09/28/2023 demonstrates a normal stress echo with some left ventricular hyperdynamics. No evidence of ischemia.           Assessment and Plan    Diagnoses and all orders for this visit:    1. Malignant neoplasm of upper lobe of left lung (Primary)  -     Ambulatory Referral to Radiation Oncology  -     CT Chest Without Contrast; Future    Ms. Mars is a pleasant 63-year-old lady with a newly diagnosed stage I squamous cell carcinoma of the left upper lobe. Her pulmonary functions would not support a curative intent resection. I would recommend she be seen by Dr. Billingsley for consideration of stereotactic radiation.  A referral was placed today.  She will plan to follow-up with him for treatment and I will plan to see her in 4 months with a CT of the chest for lung cancer surveillance.  I spent 30 minutes caring for Isis on this date of service. This time includes time spent by me in the following activities:preparing for the visit, reviewing tests, obtaining and/or reviewing a separately obtained history, performing a medically appropriate examination and/or evaluation , counseling and educating the patient/family/caregiver, ordering medications, tests, or procedures, documenting information in the medical record, and independently interpreting results and communicating that information with the patient/family/caregiver  Follow Up   No follow-ups on file.  Patient was given instructions and counseling regarding her condition or for health maintenance advice. Please see specific information pulled into the AVS if appropriate.       Transcribed from ambient  dictation for Keeley Westbrook MD by Marilynn Jones.  10/03/23   10:19 EDT    Patient or patient representative verbalized consent to the visit recording.  I have personally performed the services described in this document as transcribed by the above individual, and it is both accurate and complete.

## 2023-10-03 NOTE — PROGRESS NOTES
"Chief Complaint  Non-small cell lung cancer    Subjective          Isis Mars presents to [facility]  History of Present Illness  Ms. Mars is a pleasant 63-year-old lady who presents for follow-up with a newly diagnosed T1b N0 M0 squamous cell carcinoma of the left upper lobe.    The patient reports she is doing well. She states she spoke to doctor on 10/02/2023, and was told she is not a candidate for chemotherapy right now. She states she has an appointment with her radiation doctor, Talat Holland MD today at 1:30 PM. She reports she has a lot of mucus that does not resolve with Mucinex. She reports a history of COPD, the chronic bronchitis type.  Objective   Vital Signs:   Pulse 78   Ht 167.6 cm (66\")   Wt 94.3 kg (208 lb)   SpO2 92%   BMI 33.57 kg/m²     Physical Exam  Vitals and nursing note reviewed.   Constitutional:       Appearance: She is well-developed.   HENT:      Head: Normocephalic and atraumatic.      Nose: Nose normal.   Eyes:      Conjunctiva/sclera: Conjunctivae normal.   Cardiovascular:      Rate and Rhythm: Normal rate.   Pulmonary:      Effort: Pulmonary effort is normal.   Abdominal:      Palpations: Abdomen is soft.   Musculoskeletal:      Cervical back: Neck supple.   Skin:     General: Skin is warm and dry.   Neurological:      Mental Status: She is alert and oriented to person, place, and time.   Psychiatric:         Behavior: Behavior normal.         Thought Content: Thought content normal.         Judgment: Judgment normal.        Result Review :            I have independently reviewed the pulmonary functions performed on 09/26/2023 which demonstrates an FEV1 of 0.9 or 37 percent predicted and a DLCO of 12 or 62 percent predicted.    Stress echo performed on 09/28/2023 demonstrates a normal stress echo with some left ventricular hyperdynamics. No evidence of ischemia.           Assessment and Plan    Diagnoses and all orders for this " visit:    1. Malignant neoplasm of upper lobe of left lung (Primary)  -     Ambulatory Referral to Radiation Oncology  -     CT Chest Without Contrast; Future    Ms. Mars is a pleasant 63-year-old lady with a newly diagnosed stage I squamous cell carcinoma of the left upper lobe. Her pulmonary functions would not support a curative intent resection. I would recommend she be seen by Dr. Billingsley for consideration of stereotactic radiation.  A referral was placed today.  She will plan to follow-up with him for treatment and I will plan to see her in 4 months with a CT of the chest for lung cancer surveillance.  I spent 30 minutes caring for Isis on this date of service. This time includes time spent by me in the following activities:preparing for the visit, reviewing tests, obtaining and/or reviewing a separately obtained history, performing a medically appropriate examination and/or evaluation , counseling and educating the patient/family/caregiver, ordering medications, tests, or procedures, documenting information in the medical record, and independently interpreting results and communicating that information with the patient/family/caregiver  Follow Up   No follow-ups on file.  Patient was given instructions and counseling regarding her condition or for health maintenance advice. Please see specific information pulled into the AVS if appropriate.       Transcribed from ambient dictation for Keeley Westbrook MD by Marilynn Jones.  10/03/23   10:19 EDT    Patient or patient representative verbalized consent to the visit recording.  I have personally performed the services described in this document as transcribed by the above individual, and it is both accurate and complete.

## 2023-10-18 LAB
MYCOBACTERIUM SPEC CULT: NORMAL
NIGHT BLUE STAIN TISS: NORMAL
NIGHT BLUE STAIN TISS: NORMAL

## 2023-10-25 ENCOUNTER — TELEPHONE (OUTPATIENT)
Dept: PULMONOLOGY | Facility: CLINIC | Age: 63
End: 2023-10-25
Payer: MEDICARE

## 2023-10-25 LAB
MYCOBACTERIUM SPEC CULT: NORMAL
NIGHT BLUE STAIN TISS: NORMAL
NIGHT BLUE STAIN TISS: NORMAL

## 2023-10-25 NOTE — TELEPHONE ENCOUNTER
[facility] called the office today asking what was abnormal on the mri. We looked at the imaging report and was not sure what it would be. Please advise.

## 2023-11-08 ENCOUNTER — TELEPHONE (OUTPATIENT)
Dept: PULMONOLOGY | Facility: CLINIC | Age: 63
End: 2023-11-08

## 2023-11-08 NOTE — TELEPHONE ENCOUNTER
Hub staff attempted to follow warm transfer process and was unsuccessful     Caller: Isis Mars    Relationship to patient: Self    Best call back number: [phone]    Patient is needing: PATIENT CALLED TO MAKE MICHELLE AWARE THAT SHE IS GOING T RESCHEDULE HER ENT APPT. SHE ALSO NEEDS TO KNOW ABOUT HER GASTRO APPT. PLEASE CALL PATIENT TO ADVISE ABOUT A GASTRO APPT

## 2023-11-09 ENCOUNTER — TELEPHONE (OUTPATIENT)
Dept: GASTROENTEROLOGY | Facility: CLINIC | Age: 63
End: 2023-11-09
Payer: MEDICARE

## 2023-11-09 NOTE — TELEPHONE ENCOUNTER
Left voice message for patient to return call in regards to a urgent referral we received from Estela Vyas for Liver lesion,upper abdominal pain  She said this appointment would be fine. Muna has one opening on 11/29/23 at 8:30.

## 2023-11-09 NOTE — TELEPHONE ENCOUNTER
Patient called and schedule an appointment on appointment on 1/3/24 at 10:00. Since this was an urgent referral she couldn't take any other date and times until after 12/19/23. She is taking radiation treatments.I have also added her to the cancellation list.

## 2023-12-01 ENCOUNTER — TELEPHONE (OUTPATIENT)
Dept: GASTROENTEROLOGY | Facility: CLINIC | Age: 63
End: 2023-12-01
Payer: MEDICARE

## 2023-12-01 NOTE — TELEPHONE ENCOUNTER
Left voice message for patient to return call to see if she would like an earlier appointment with Dr. Meyer. She has an appointment with Minerva Holman in [place] and Dr. Meyer has appointment on 12/8/23.

## 2023-12-22 ENCOUNTER — TELEPHONE (OUTPATIENT)
Dept: PULMONOLOGY | Facility: CLINIC | Age: 63
End: 2023-12-22

## 2023-12-22 NOTE — TELEPHONE ENCOUNTER
Caller: Isis Mars    Relationship to patient: Self    Best call back number: [phone]    Patient is needing: PT [employer] IS SENDING OVER PAPER WORK FOR HER APPT TO GASTRO ON 1/3 ASKED IF WE COULD FILL OUT THE PAPERWORK AND PUT PT WILL BE THERE AT 9:30. STATED FAX WAS SENT TODAY

## 2023-12-27 ENCOUNTER — TELEPHONE (OUTPATIENT)
Dept: RADIATION ONCOLOGY | Facility: HOSPITAL | Age: 63
End: 2023-12-27
Payer: MEDICARE

## 2023-12-27 NOTE — TELEPHONE ENCOUNTER
Called patient in regards to her cancelled consult with Dr. Billingsley on 9/22/2023.  [facility].

## 2024-01-03 ENCOUNTER — OFFICE VISIT (OUTPATIENT)
Dept: GASTROENTEROLOGY | Facility: CLINIC | Age: 64
End: 2024-01-03
Payer: MEDICARE

## 2024-01-03 VITALS
DIASTOLIC BLOOD PRESSURE: 72 MMHG | WEIGHT: 210 LBS | BODY MASS INDEX: 33.75 KG/M2 | SYSTOLIC BLOOD PRESSURE: 127 MMHG | HEIGHT: 66 IN | HEART RATE: 67 BPM

## 2024-01-03 DIAGNOSIS — R11.0 NAUSEA: ICD-10-CM

## 2024-01-03 DIAGNOSIS — R12 HEARTBURN: ICD-10-CM

## 2024-01-03 DIAGNOSIS — R10.11 RIGHT UPPER QUADRANT ABDOMINAL PAIN: Primary | ICD-10-CM

## 2024-01-03 PROCEDURE — 1160F RVW MEDS BY RX/DR IN RCRD: CPT | Performed by: NURSE PRACTITIONER

## 2024-01-03 PROCEDURE — 1159F MED LIST DOCD IN RCRD: CPT | Performed by: NURSE PRACTITIONER

## 2024-01-03 PROCEDURE — 99214 OFFICE O/P EST MOD 30 MIN: CPT | Performed by: NURSE PRACTITIONER

## 2024-01-03 RX ORDER — DICYCLOMINE HYDROCHLORIDE 10 MG/1
1 CAPSULE ORAL 3 TIMES DAILY
COMMUNITY
Start: 2023-12-28

## 2024-01-03 NOTE — PROGRESS NOTES
Chief Complaint     Abdominal Pain and Abnormal Imaging    History of Present Illness     Isis Mars is a 63 y.o. female who presents to [facility] on referral from JO-ANN Lu for a gastroenterology evaluation of abnormal imaging.      She was diagnosed with squamous cell carcinoma of the lung in September.  She was treated with radiation and completed that 2 weeks ago.      She reports RUQ pain that's sporadic.  It's worse following meals with greasy foods.  She had an episode in June that lasted for 2 weeks.      Admits nausea.  Reports heartburn that's controlled with omeprazole.  Reports previous EGD, but this has been several years ago in [place].       History      Past Medical History:   Diagnosis Date    Arthritis     Asthma     Asthma, intrinsic     Atypical lobular hyperplasia of right breast     Bronchiectasis     Chronic bronchitis     COPD (chronic obstructive pulmonary disease)     INHALER    Emphysema of lung 6-06-23    GERD (gastroesophageal reflux disease)     Lung nodule     NE (obstructive sleep apnea) 06/11/2021    Primary central sleep apnea     Sleep apnea     SOB (shortness of breath)        Past Surgical History:   Procedure Laterality Date    ABDOMINAL SURGERY      (RIGHT IHR)    APPENDECTOMY      BLADDER SURGERY      BREAST SURGERY      BIOPSY    BRONCHOSCOPY WITH ION ROBOTIC ASSIST N/A 09/13/2023    Procedure: BRONCHOSCOPY WITH ION ROBOT, REBUS, NEEDLE ASPIRATE, BRUSHINGS, BIOPSIES, BAL;  Surgeon: Hortencia Berger MD;  Location: [facility] OR;  Service: Robotics - Pulmonary;  Laterality: N/A;    COLONOSCOPY      over 10 years patient cant remember when    HERNIA REPAIR      HYSTERECTOMY      UPPER GASTROINTESTINAL ENDOSCOPY      pt can't remember when       Family History   Problem Relation Age of Onset    Emphysema Father     Diabetes Brother     Colon cancer Neg Hx         Current Medications        Current Outpatient Medications:      albuterol sulfate HFA (Ventolin HFA) 108 (90 Base) MCG/ACT inhaler, Inhale 2 puffs Every 6 (Six) Hours As Needed for Wheezing., Disp: 18 g, Rfl: 4    alendronate (FOSAMAX) 70 MG tablet, TAKE 1 TABLET BY MOUTH ONCE WEEKLY BEFORE BREAKFAST, ON EMPTY STOMACH; REMAIN UPRIGHT FOR 30 MINUTES; TAKE WITH 8OZ OF WATER, Disp: , Rfl:     aspirin 81 MG EC tablet, Take 1 tablet by mouth Daily. Last dose 9/8/23 per pt as direct by keanu, Disp: , Rfl:     azelastine (ASTELIN) 0.1 % nasal spray, SPRAY ONE SPRAY IN EACH NOSTRIL EVERY DAY, Disp: , Rfl:     busPIRone (BUSPAR) 15 MG tablet, Take 1 tablet by mouth 3 (Three) Times a Day., Disp: , Rfl:     Calcium Carbonate 1500 (600 Ca) MG tablet, Take 1 tablet by mouth Daily., Disp: , Rfl:     diazePAM (VALIUM) 5 MG tablet, Take 1 tablet by mouth 1 (One) Time As Needed for Anxiety for up to 2 doses. May repeat, Disp: 2 tablet, Rfl: 0    dicyclomine (BENTYL) 10 MG capsule, Take 1 capsule by mouth 3 times a day., Disp: , Rfl:     EPINEPHrine (EPIPEN) 0.3 MG/0.3ML solution auto-injector injection, See Admin Instructions. Inject into the middle of the outer thigh and hold for 3 seconds as needed for severe allergic reaction then call 911 if used, Disp: , Rfl:     fluticasone (FLONASE) 50 MCG/ACT nasal spray, 2 sprays by Each Nare route Daily., Disp: 11.1 mL, Rfl: 5    furosemide (LASIX) 20 MG tablet, Take 1 tablet by mouth Daily., Disp: , Rfl:     hydrOXYzine (ATARAX) 50 MG tablet, Take 1 tablet by mouth Daily., Disp: , Rfl:     ibuprofen (ADVIL,MOTRIN) 800 MG tablet, Take 1 tablet by mouth 3 (Three) Times a Day As Needed. for pain, Disp: , Rfl:     levocetirizine (XYZAL) 5 MG tablet, Take 1 tablet by mouth Every Morning. (Patient taking differently: Take 1 tablet by mouth Every Evening.), Disp: 90 tablet, Rfl: 3    meclizine (ANTIVERT) 12.5 MG tablet, TAKE ONE TABLET BY MOUTH EVERY 8 HOURS AS NEEDED FOR FOR DIZZINESS - HOLD OTHER FOR ALLERGY MEDS WHEN IN USE, Disp: , Rfl:      "meloxicam (MOBIC) 15 MG tablet, meloxicam 15 mg oral tablet take 1 tablet (15 mg) by oral route once daily   Active, Disp: , Rfl:     montelukast (SINGULAIR) 10 MG tablet, Take 1 tablet by mouth Every Night for 360 days., Disp: 90 tablet, Rfl: 3    omeprazole (priLOSEC) 40 MG capsule, Take 1 capsule by mouth Daily., Disp: , Rfl:     Trelegy Ellipta 200-62.5-25 MCG/ACT aerosol powder , Inhale 1 puff Daily., Disp: 1 each, Rfl: 11    vitamin D3 125 MCG (5000 UT) capsule capsule, Take 1 capsule by mouth Daily., Disp: , Rfl:     Daliresp 500 MCG tablet tablet, Take 1 tablet by mouth Daily for 90 days., Disp: 90 tablet, Rfl: 3     Allergies     Allergies   Allergen Reactions    Azithromycin Shortness Of Breath and Swelling    Sulfa Antibiotics Unknown - High Severity    Sulfamethoxazole Swelling    Doxycycline Rash    Sulfamethoxazole-Trimethoprim Unknown - High Severity     Makes pt feel weird        Social History       Social History     Social History Narrative    Not on file       Immunizations     Immunization:  Immunization History   Administered Date(s) Administered    COVID-19 (PFIZER) BIVALENT 12+YRS 03/09/2023    COVID-19 (PFIZER) Purple Cap Monovalent 03/22/2021, 04/12/2021, 12/02/2021    Flu Vaccine Quad PF >36MO 11/22/2016    Fluzone (or Fluarix & Flulaval for VFC) >6mos 11/22/2016, 09/20/2022, 09/18/2023    Influenza Injectable Mdck Pf Quad 10/11/2021    Influenza, Unspecified 12/01/2018, 10/11/2021    Pneumococcal Polysaccharide (PPSV23) 10/01/2013          Objective     Objective     Vital Signs:   /72 (BP Location: Left arm, Patient Position: Sitting, Cuff Size: Adult)   Pulse 67   Ht 167.6 cm (66\")   Wt 95.3 kg (210 lb)   BMI 33.89 kg/m²       Physical Exam    Results      Result Review :   The following data was reviewed by: JO-ANN Nava on 01/03/2024:    6/2/2023 CT abdomen with and without contrast-emphysema in the lung bases.  Small hiatal hernia. 1.5 cm lesion in the lateral " segment of the left hepatic lobe.  Findings are suggestive of flash filling hemangioma.  Focal fatty infiltration in the medial segment of the left hepatic lobe.  Colonic diverticulosis.    8/31/2023 PET scan-1.1 cm left upper lobe lung nodule concerning for neoplasm.  3 mm right lower lobe lung nodule too small to characterize.  Previously seen arterial enhancing left lobe liver lesion is not hypermetabolic.    12/28/2023 right upper quadrant ultrasound-normal gallbladder.  Common bile duct 4 mm.    12/28/2023 CT abdomen pelvis with IV contrast-liver, spleen, pancreas, kidneys and adrenal glands appear within normal limits.  Normal gallbladder.  The stomach and small bowel are normal.  Multiple diverticuli in the distal left colon and sigmoid colon.           [id number] and Plan        Assessment and Plan    Diagnoses and all orders for this visit:    1. Right upper quadrant abdominal pain (Primary)  -     NM HIDA SCAN WITH PHARMACOLOGICAL INTERVENTION; Future    2. Nausea  -     NM HIDA SCAN WITH PHARMACOLOGICAL INTERVENTION; Future    3. Heartburn  -     NM HIDA SCAN WITH PHARMACOLOGICAL INTERVENTION; Future      If HIDA scan normal, suggest EGD.          Follow Up        Follow Up   Return in about 5 months (around 6/3/2024) for abdominal pain.  Patient was given instructions and counseling regarding her condition or for health maintenance advice. Please see specific information pulled into the AVS if appropriate.

## 2024-01-31 ENCOUNTER — TELEPHONE (OUTPATIENT)
Dept: GASTROENTEROLOGY | Facility: CLINIC | Age: 64
End: 2024-01-31
Payer: MEDICARE

## 2024-01-31 NOTE — TELEPHONE ENCOUNTER
----- Message from JO-ANN Nava sent at 1/30/2024  1:14 PM EST -----  HIDA scan reveals normal gallbladder function.  If patient is continuing to experience pain, recommend EGD for further w/u of symptoms.

## 2024-01-31 NOTE — TELEPHONE ENCOUNTER
Patient notified of results and verbalized understanding, patient wishes to hold off on EGD for now.

## 2024-04-15 ENCOUNTER — TELEPHONE (OUTPATIENT)
Dept: PULMONOLOGY | Facility: CLINIC | Age: 64
End: 2024-04-15

## 2024-04-15 NOTE — TELEPHONE ENCOUNTER
Caller: Isis Mars    Relationship to patient: Self    Best call back number: [phone]     Patient is needing: PATIENT STATES THAT [facility] HAD PUT IN ORDER FOR BIPAP MACHINE. [facility] STATES THAT THEY NEED MORE INFORMATION, PATIENT IS NOT SURE WHAT EXACTLY THEY NEED. THEY KEEP TELLING HER TO CALL BACK IN A WEEK AND THEN STATE THAT THEY NEED MORE INFORMATION FROM [facility] OR DR. BARRAZA AND THIS IS GOING ON FOR ABOUT A MONTH NOW. PLEASE CALL BACK TO ADVISE.

## 2024-04-16 NOTE — TELEPHONE ENCOUNTER
Called and spoke with Deandre Schafer the pts 2013 sleep study has an AHI of 0 which doesn't qualify pt for a BIPAP.  Pts 6/5/19 Bi level titration study states that pt had a Poly on 6/5/19.  I contacted J[facility] in sleep and the only thing performed on 6/5/19 was the titration and the only sleep study we have is in 2013.  I called [facility] and [facility] to see if she had one done with them, she hadnt.      I called pt to give her all this info.  She is agreeable to come in for an appt and have a new sleep study and titration ordered.  Appt has been made and pt is aware.

## 2024-05-07 ENCOUNTER — OFFICE VISIT (OUTPATIENT)
Dept: PULMONOLOGY | Facility: CLINIC | Age: 64
End: 2024-05-07
Payer: MEDICARE

## 2024-05-07 VITALS
WEIGHT: 210 LBS | RESPIRATION RATE: 18 BRPM | HEART RATE: 77 BPM | HEIGHT: 66 IN | BODY MASS INDEX: 33.75 KG/M2 | DIASTOLIC BLOOD PRESSURE: 66 MMHG | SYSTOLIC BLOOD PRESSURE: 115 MMHG | OXYGEN SATURATION: 91 %

## 2024-05-07 DIAGNOSIS — J44.9 CHRONIC OBSTRUCTIVE PULMONARY DISEASE, UNSPECIFIED COPD TYPE: ICD-10-CM

## 2024-05-07 DIAGNOSIS — J96.11 HYPOXEMIC RESPIRATORY FAILURE, CHRONIC: ICD-10-CM

## 2024-05-07 DIAGNOSIS — Z23 NEED FOR PNEUMOCOCCAL 20-VALENT CONJUGATE VACCINATION: ICD-10-CM

## 2024-05-07 DIAGNOSIS — R06.00 DYSPNEA, UNSPECIFIED TYPE: ICD-10-CM

## 2024-05-07 DIAGNOSIS — G47.33 OSA (OBSTRUCTIVE SLEEP APNEA): Primary | ICD-10-CM

## 2024-05-07 DIAGNOSIS — F17.201 TOBACCO ABUSE, IN REMISSION: ICD-10-CM

## 2024-05-07 DIAGNOSIS — C34.92 PRIMARY LUNG CANCER, LEFT: ICD-10-CM

## 2024-05-07 PROCEDURE — 1159F MED LIST DOCD IN RCRD: CPT | Performed by: NURSE PRACTITIONER

## 2024-05-07 PROCEDURE — 1160F RVW MEDS BY RX/DR IN RCRD: CPT | Performed by: NURSE PRACTITIONER

## 2024-05-07 PROCEDURE — 99214 OFFICE O/P EST MOD 30 MIN: CPT | Performed by: NURSE PRACTITIONER

## 2024-05-07 PROCEDURE — G0009 ADMIN PNEUMOCOCCAL VACCINE: HCPCS | Performed by: NURSE PRACTITIONER

## 2024-05-07 PROCEDURE — 90677 PCV20 VACCINE IM: CPT | Performed by: NURSE PRACTITIONER

## 2024-05-07 RX ORDER — FLUTICASONE FUROATE, UMECLIDINIUM BROMIDE AND VILANTEROL TRIFENATATE 200; 62.5; 25 UG/1; UG/1; UG/1
1 POWDER RESPIRATORY (INHALATION) DAILY
Qty: 1 EACH | Refills: 11 | Status: SHIPPED | OUTPATIENT
Start: 2024-05-07

## 2024-05-07 RX ORDER — ROFLUMILAST 500 UG/1
500 TABLET ORAL DAILY
Qty: 90 TABLET | Refills: 3 | Status: SHIPPED | OUTPATIENT
Start: 2024-05-07

## 2024-05-07 RX ORDER — LEVOCETIRIZINE DIHYDROCHLORIDE 5 MG/1
5 TABLET, FILM COATED ORAL EVERY MORNING
Qty: 90 TABLET | Refills: 3 | Status: SHIPPED | OUTPATIENT
Start: 2024-05-07

## 2024-05-07 RX ORDER — FLUTICASONE PROPIONATE 50 MCG
2 SPRAY, SUSPENSION (ML) NASAL DAILY
Qty: 11.1 ML | Refills: 5 | Status: SHIPPED | OUTPATIENT
Start: 2024-05-07

## 2024-05-07 RX ORDER — ALBUTEROL SULFATE 90 UG/1
2 AEROSOL, METERED RESPIRATORY (INHALATION) EVERY 6 HOURS PRN
Qty: 18 G | Refills: 4 | Status: SHIPPED | OUTPATIENT
Start: 2024-05-07

## 2024-05-07 RX ORDER — MONTELUKAST SODIUM 10 MG/1
10 TABLET ORAL NIGHTLY
Qty: 90 TABLET | Refills: 3 | Status: SHIPPED | OUTPATIENT
Start: 2024-05-07 | End: 2025-05-02

## 2024-05-08 ENCOUNTER — TELEPHONE (OUTPATIENT)
Dept: OTHER | Facility: HOSPITAL | Age: 64
End: 2024-05-08
Payer: MEDICARE

## 2024-08-14 ENCOUNTER — TELEPHONE (OUTPATIENT)
Dept: PULMONOLOGY | Facility: CLINIC | Age: 64
End: 2024-08-14
Payer: MEDICARE

## 2024-08-14 NOTE — TELEPHONE ENCOUNTER
"@Relay     \"LVM for patient to return call. She needs an appointment with Estela to get an order for her Cpap machine.\"                 "

## 2024-08-14 NOTE — TELEPHONE ENCOUNTER
Patient is needing scheduled with Estela for a follow up for issues with her PAP machine, keshia rizzo called and said patient is negative for sleep apnea and got her machine back in 2013 for resp failure. Please schedule patient for follow up. Ok for HUB to schedule.

## 2024-08-14 NOTE — TELEPHONE ENCOUNTER
Hub staff attempted to follow warm transfer process and was unsuccessful     Caller: Isis Mars    Relationship to patient: Self    Best call back number: [phone]     Patient is needing: PATIENT STATES WHEN SHE SPOKE WITH EVETTE JACKSON, THEY TOLD HER THEY DID NOT CALL US, THEY SENT AN ORDER. BUT SHE ALSO STATED SHE IS WORKING WITH [facility] TO GET A NEW CPAP. SHE STATES SHE WOULD LIKE TO WAIT UNTIL SHE KNOWS IF INSURANCE WILL COVER HER CPAP BEFORE MAKING AN APPOINTMENT.

## 2025-05-15 DIAGNOSIS — F17.201 TOBACCO ABUSE, IN REMISSION: ICD-10-CM

## 2025-05-15 DIAGNOSIS — G47.33 OSA (OBSTRUCTIVE SLEEP APNEA): ICD-10-CM

## 2025-05-15 DIAGNOSIS — J44.9 CHRONIC OBSTRUCTIVE PULMONARY DISEASE, UNSPECIFIED COPD TYPE: ICD-10-CM

## 2025-05-15 DIAGNOSIS — J96.11 HYPOXEMIC RESPIRATORY FAILURE, CHRONIC: ICD-10-CM

## 2025-05-15 DIAGNOSIS — C34.92 PRIMARY LUNG CANCER, LEFT: ICD-10-CM

## 2025-05-15 DIAGNOSIS — R06.00 DYSPNEA, UNSPECIFIED TYPE: ICD-10-CM

## 2025-05-15 DIAGNOSIS — Z23 NEED FOR PNEUMOCOCCAL 20-VALENT CONJUGATE VACCINATION: ICD-10-CM

## 2025-05-15 RX ORDER — LEVOCETIRIZINE DIHYDROCHLORIDE 5 MG/1
5 TABLET, FILM COATED ORAL EVERY MORNING
Qty: 90 TABLET | Refills: 3 | OUTPATIENT
Start: 2025-05-15

## (undated) DEVICE — REPROCESSING ACCESSORIES KIT

## (undated) DEVICE — SENSOR CONNECTION CLEANER

## (undated) DEVICE — LINER SURG CANSTR SXN S/RIGD 1500CC

## (undated) DEVICE — VISION PROBE ADAPTER AND SUCTION ADAPTER

## (undated) DEVICE — GOWN ISOL OVR/HD TIE THMB/LP/CUF PE XLG BLU

## (undated) DEVICE — Device: Brand: BALLOON

## (undated) DEVICE — FRCP BIOP CAPTURA BRONCH 1.8 110CM BLU

## (undated) DEVICE — DISPOSABLE CYTOLOGY BRUSH: Brand: DISPOSABLE CYTOLOGY BRUSH

## (undated) DEVICE — REPROCESSING CONSUMABLES KIT

## (undated) DEVICE — TRAP,MUCUS SPECIMEN,40CC: Brand: MEDLINE

## (undated) DEVICE — SINGLE USE BIOPSY VALVE MAJ-210: Brand: SINGLE USE BIOPSY VALVE (STERILE)

## (undated) DEVICE — BIOPSY NEEDLE, 21G: Brand: FLEXISION

## (undated) DEVICE — SWIVEL CONNECTOR

## (undated) DEVICE — REPROCESSING COVER

## (undated) DEVICE — CATHETER: Brand: ION

## (undated) DEVICE — Device: Brand: ION

## (undated) DEVICE — CUP SPECI 4OZ LF STRL

## (undated) DEVICE — Device

## (undated) DEVICE — STPCK 1WY SPINLOCK FML LL NONDEHP LF .20ML

## (undated) DEVICE — SINGLE USE ASPIRATION NEEDLE: Brand: SINGLE USE ASPIRATION NEEDLE

## (undated) DEVICE — CATHETER GUIDE

## (undated) DEVICE — SINGLE USE SUCTION VALVE MAJ-209: Brand: SINGLE USE SUCTION VALVE (STERILE)

## (undated) DEVICE — SOLIDIFIER LIQLOC PLS 1500CC BT

## (undated) DEVICE — VISION PROBE: Brand: ION